# Patient Record
Sex: FEMALE | Race: BLACK OR AFRICAN AMERICAN | NOT HISPANIC OR LATINO | Employment: FULL TIME | URBAN - METROPOLITAN AREA
[De-identification: names, ages, dates, MRNs, and addresses within clinical notes are randomized per-mention and may not be internally consistent; named-entity substitution may affect disease eponyms.]

---

## 2017-02-24 ENCOUNTER — TELEPHONE (OUTPATIENT)
Dept: GASTROENTEROLOGY | Facility: CLINIC | Age: 52
End: 2017-02-24

## 2017-02-24 NOTE — TELEPHONE ENCOUNTER
----- Message from Lee Ann Kee sent at 2/24/2017  8:21 AM CST -----  Contact: Dr Avalos Office/Vikki  Caller states she is returning the nurse call, please contact caller at [phone]

## 2017-02-24 NOTE — TELEPHONE ENCOUNTER
Spoke with Kenia with Dr. Avalos. I explained to her I sent a request for the slides from liver biopsy done on 07/03/2014 from [facility]. Waited several weeks, called [facility] Pathology Department was told Dr. Avalos checked the slides out on 07/23/2014. Sent another request to Dr. Avalos's office on 10/20/2016 for the slides. We heard no response. Called Dr. Avalos's office and was told they were trying to locate the slides and Jerica was working to find them. Spoke with Jerica with Dr. Avalos. She states Dr. Avalos check out the liver biopsy slides to Dr. Pollard. She called Dr. Pollard's office about the slides and his office could not locate them.  She failed to let Dr. Dempsey know about the misplaced slides and apologized for not letting us know sooner. I also apologized for not following up on the matter sooner. Will inform Dr. Dempsey.

## 2017-02-26 NOTE — TELEPHONE ENCOUNTER
Thank you for following up on this please let patient now we are unable to find the slides for review here but do have the pathology report. Please have the report scanned into media.

## 2017-02-28 NOTE — TELEPHONE ENCOUNTER
Spoke with patient. Informed her liver biopsies have been misplaced by Dr. Pollard's office but they are trying to locate them. The pathology report has been sent and we have received it, she verbalized understanding.

## 2017-03-10 ENCOUNTER — TELEPHONE (OUTPATIENT)
Dept: GASTROENTEROLOGY | Facility: CLINIC | Age: 52
End: 2017-03-10

## 2017-03-10 NOTE — TELEPHONE ENCOUNTER
----- Message from Ashtyn Brown sent at 3/10/2017  3:23 PM CST -----  Contact: [facility]   She is calling to verify that they have the right orders.   Call her at [phone].                                hartman

## 2017-03-17 ENCOUNTER — OFFICE VISIT (OUTPATIENT)
Dept: GASTROENTEROLOGY | Facility: CLINIC | Age: 52
End: 2017-03-17
Payer: COMMERCIAL

## 2017-03-17 VITALS
WEIGHT: 135.81 LBS | BODY MASS INDEX: 22.63 KG/M2 | HEIGHT: 65 IN | DIASTOLIC BLOOD PRESSURE: 82 MMHG | HEART RATE: 68 BPM | SYSTOLIC BLOOD PRESSURE: 154 MMHG

## 2017-03-17 DIAGNOSIS — K75.4 AUTOIMMUNE HEPATITIS: Primary | ICD-10-CM

## 2017-03-17 DIAGNOSIS — F41.9 ANXIETY: ICD-10-CM

## 2017-03-17 PROCEDURE — 99999 PR PBB SHADOW E&M-EST. PATIENT-LVL III: CPT | Mod: PBBFAC,,, | Performed by: INTERNAL MEDICINE

## 2017-03-17 PROCEDURE — 1160F RVW MEDS BY RX/DR IN RCRD: CPT | Mod: S$GLB,,, | Performed by: INTERNAL MEDICINE

## 2017-03-17 PROCEDURE — 99214 OFFICE O/P EST MOD 30 MIN: CPT | Mod: S$GLB,,, | Performed by: INTERNAL MEDICINE

## 2017-03-17 RX ORDER — MYCOPHENOLATE MOFETIL 250 MG/1
750 CAPSULE ORAL 2 TIMES DAILY
Qty: 180 CAPSULE | Refills: 5 | Status: SHIPPED | OUTPATIENT
Start: 2017-03-17 | End: 2017-06-16 | Stop reason: DRUGHIGH

## 2017-03-17 NOTE — MR AVS SNAPSHOT
[facility]  [address] [place] [address]  Phone: [phone]  Fax: [phone]                  Debbie Mackenzie   3/17/2017 1:30 PM   Office Visit    Description:  Female : 1965   Provider:  Liv Dempsey MD   Department:  [facility] - Gastroenterology           Reason for Visit     Hepatitis           Diagnoses this Visit        Comments    Autoimmune hepatitis    -  Primary            To Do List           Future Appointments        Provider Department Dept Phone    2017 9:30 AM Liv Dempsey MD [facility] [phone]      Goals (5 Years of Data)     None      Follow-Up and Disposition     Return in about 3 months (around 2017).       These Medications        Disp Refills Start End    mycophenolate (CELLCEPT) 250 mg Cap 180 capsule 5 3/17/2017 3/17/2018    Take 3 capsules (750 mg total) by mouth 2 (two) times daily. - Oral    Pharmacy: [id number] - [place] - [address] Ph #: [phone]         [facility] On Call     [facility] On Call Nurse Care Line -  Assistance  Registered nurses in the [facility] On Call Center provide clinical advisement, health education, appointment booking, and other advisory services.  Call for this free service at [phone].             Medications           Message regarding Medications     Verify the changes and/or additions to your medication regime listed below are the same as discussed with your clinician today.  If any of these changes or additions are incorrect, please notify your healthcare provider.        START taking these NEW medications        Refills    mycophenolate (CELLCEPT) 250 mg Cap 5    Sig: Take 3 capsules (750 mg total) by mouth 2 (two) times daily.    Class: Normal    Route: Oral      STOP taking these medications     mycophenolate (CELLCEPT) 500 mg Tab Take 2 tablets (1,000 mg total) by mouth 2 (two) times daily.           Verify that the below list of medications is an accurate  "representation of the medications you are currently taking.  If none reported, the list may be blank. If incorrect, please contact your healthcare provider. Carry this list with you in case of emergency.           Current Medications     alprazolam (XANAX) 0.5 MG tablet Take 0.5 mg by mouth 3 (three) times daily.    calcium-vitamin D (OSCAL) 250 (625)-125 mg-unit per tablet Take 1 tablet by mouth once daily.    ergocalciferol (VITAMIN D2) 50,000 unit Cap Take 50,000 Units by mouth every 7 days.    levocetirizine (XYZAL) 5 MG tablet Take 5 mg by mouth every evening.    metoprolol succinate (TOPROL-XL) 25 MG 24 hr tablet Take 25 mg by mouth once daily.    pantoprazole (PROTONIX) 40 MG tablet TAKE ONE TABLET BY MOUTH ONCE DAILY    tramadol (ULTRAM) 50 mg tablet Take 50 mg by mouth every 6 (six) hours as needed for Pain.    zolpidem (AMBIEN) 10 mg Tab Take 5 mg by mouth nightly as needed.    mycophenolate (CELLCEPT) 250 mg Cap Take 3 capsules (750 mg total) by mouth 2 (two) times daily.           Clinical Reference Information           Your Vitals Were     BP Pulse Height Weight BMI    154/82 68 5' 4.8" (1.646 m) 61.6 kg (135 lb 12.9 oz) 22.74 kg/m2      Blood Pressure          Most Recent Value    BP  (!)  154/82      Allergies as of 3/17/2017     Aspirin    Sulfa (Sulfonamide Antibiotics)      Immunizations Administered on Date of Encounter - 3/17/2017     None      Orders Placed During Today's Visit     Recurring Lab Work Interval Expires    CBC auto differential   3/17/2018    Comprehensive metabolic panel   3/17/2018      Language Assistance Services     ATTENTION: Language assistance services are available, free of charge. Please call [phone].      ATENCIÓN: Si calosla gualberto, tiene a hoffman disposición servicios gratuitos de asistencia lingüística. Llame al [phone].     CHÚ Ý: N?u b?n nói Ti?ng Vi?t, có các d?ch v? h? tr? ngôn ng? mi?n phí dành cho b?n. G?i s? [phone].         [facility] complies with applicable Federal civil rights laws and does not discriminate on the basis of race, color, national origin, age, disability, or sex.

## 2017-03-17 NOTE — PROGRESS NOTES
Subjective:     Debbie Mackenzie is here for follow up of Hepatitis (autoimmune hepaptitis)      HPI  Since Debbie Mackenzie's last visit she had a fall at school which caused some bruising to her right chest.  She continues have some tenderness at that area.  The discomfort is mild in intensity.  It is exacerbated by movement.  Otherwise she denies any acute issues.  She continues to take her medications.  There've been no flares of her multiple sclerosis.    She denies a significant issues with anxiety and wonders if she should increase her benzodiazepine.  She does not see anyone in particular for her anxiety issues.  She reports her anxiety got worse when she started having significant elevations in her liver tests.  Of note we have tried to get her liver biopsy on multiple occasions and this seems to be missing.  Multiple calls and then made to both her gastroenterologist in [place] and her previous hepatologist in [place] and the slides cannot be obtained.  The report has been obtained.    The patient does note having significantly abnormal liver test in the past and was tried on steroids and the steroids were weaned the liver test got worse until she was placed on CellCept.    Outside bx   7/2014  Mild inflammation unclear etiology, stage 1    Review of Systems   Constitutional: Negative.    HENT: Negative.    Eyes: Negative.    Respiratory: Negative.    Cardiovascular: Negative.    Gastrointestinal: Negative.    Genitourinary: Negative.    Musculoskeletal: Positive for arthralgias and myalgias.   Skin: Negative.    Neurological: Negative.    Psychiatric/Behavioral: The patient is nervous/anxious.        Objective:     Physical Exam   Constitutional: She is oriented to person, place, and time. She appears well-developed and well-nourished. No distress.   HENT:   Head: Normocephalic and atraumatic.   Mouth/Throat: Oropharynx is clear and moist. No oropharyngeal exudate.   Eyes: Conjunctivae are normal.  Pupils are equal, round, and reactive to light. Right eye exhibits no discharge. Left eye exhibits no discharge. No scleral icterus.   Pulmonary/Chest: Effort normal and breath sounds normal. No respiratory distress. She has no wheezes.   Abdominal: Soft. She exhibits no distension. There is no tenderness.   Musculoskeletal: She exhibits no edema.   Neurological: She is alert and oriented to person, place, and time.   Psychiatric: She has a normal mood and affect. Her behavior is normal.   Vitals reviewed.      Computed MELD-Na score unavailable. Necessary lab results were not found.  Computed MELD score unavailable. Necessary lab results were not found.    WBC   Date Value Ref Range Status   12/16/2016 4.68 3.90 - 12.70 K/uL Final     Hemoglobin   Date Value Ref Range Status   12/16/2016 11.2 (L) 12.0 - 16.0 g/dL Final     Hematocrit   Date Value Ref Range Status   12/16/2016 36.8 (L) 37.0 - 48.5 % Final     Platelets   Date Value Ref Range Status   12/16/2016 269 150 - 350 K/uL Final     BUN, Bld   Date Value Ref Range Status   12/16/2016 16 6 - 20 mg/dL Final     Creatinine   Date Value Ref Range Status   12/16/2016 1.0 0.5 - 1.4 mg/dL Final     Glucose   Date Value Ref Range Status   12/16/2016 84 70 - 110 mg/dL Final     Calcium   Date Value Ref Range Status   12/16/2016 9.5 8.7 - 10.5 mg/dL Final     Sodium   Date Value Ref Range Status   12/16/2016 141 136 - 145 mmol/L Final     Potassium   Date Value Ref Range Status   12/16/2016 3.5 3.5 - 5.1 mmol/L Final     Chloride   Date Value Ref Range Status   12/16/2016 105 95 - 110 mmol/L Final     AST   Date Value Ref Range Status   12/16/2016 14 10 - 40 U/L Final     ALT   Date Value Ref Range Status   12/16/2016 9 (L) 10 - 44 U/L Final     Alkaline Phosphatase   Date Value Ref Range Status   12/16/2016 96 55 - 135 U/L Final     Total Bilirubin   Date Value Ref Range Status   12/16/2016 0.4 0.1 - 1.0 mg/dL Final     Comment:     For infants and newborns,  interpretation of results should be based  on gestational age, weight and in agreement with clinical  observations.  Premature Infant recommended reference ranges:  Up to 24 hours.............<8.0 mg/dL  Up to 48 hours............<12.0 mg/dL  3-5 days..................<15.0 mg/dL  6-29 days.................<15.0 mg/dL       Albumin   Date Value Ref Range Status   12/16/2016 4.1 3.5 - 5.2 g/dL Final     No results found for: INR      Assessment/Plan:     1. Autoimmune hepatitis    2. Anxiety      Debbie Mackenzie is a 51 y.o. female withHepatitis (autoimmune hepaptitis)    Autoimmune hepatitis-liver tests have been normal.  The patient was diagnosed with autoimmune hepatitis another transplant center.  I suspect this diagnosis is accurate based on the fact that would decrease in steroids her liver tests worsened but I would've liked to reviewed the pathology at the time of that diagnosis as the report is not classic for autoimmune hepatitis.  She does have risk factors as she also has multiple sclerosis and she is an -American woman.  I would like to decrease immunosuppression if possible given she is in a large dose of CellCept that has not ever been weaned.  -Slow titration of CellCept with monitoring labs every 2 weeks  -     mycophenolate (CELLCEPT) 250 mg Cap; Take 3 capsules (750 mg total) by mouth 2 (two) times daily.  Dispense: 180 capsule; Refill: 5  -     Comprehensive metabolic panel; Standing  -     CBC auto differential; Standing    Anxiety-uncontrolled  -Advise patient to see a psychologist or psychiatrist    Return to clinic in 3 months          Liv Dempsey MD

## 2017-04-03 ENCOUNTER — TELEPHONE (OUTPATIENT)
Dept: GASTROENTEROLOGY | Facility: CLINIC | Age: 52
End: 2017-04-03

## 2017-04-03 NOTE — TELEPHONE ENCOUNTER
Spoke with patient. She wants to verify if labs are every two weeks or weekly. Will inform Dr. Dempsey, she verbalized understanding.

## 2017-04-03 NOTE — TELEPHONE ENCOUNTER
----- Message from Rina Mueller sent at 4/3/2017  3:13 PM CDT -----  Contact: Patient   Patient request call back at [phone], Regards to her labs orders.    Thanks  Td

## 2017-04-03 NOTE — TELEPHONE ENCOUNTER
----- Message from Teresa Pollock sent at 4/3/2017  8:24 AM CDT -----  Contact: Patient  Patient needs to speak to nurse to clarify her labs, there was some confusion at the lab. Please call, patient back at [phone]. Thank you

## 2017-04-06 NOTE — TELEPHONE ENCOUNTER
Spoke with patient. Informed her labs were discussed with Dr. Dempsey and scanned into Epic.  Dr. Dempsey states labs are stable and to recheck in 2 weeks with possible consideration of medication changes, she verbalized understanding.

## 2017-04-20 ENCOUNTER — PATIENT MESSAGE (OUTPATIENT)
Dept: GASTROENTEROLOGY | Facility: CLINIC | Age: 52
End: 2017-04-20

## 2017-06-16 ENCOUNTER — OFFICE VISIT (OUTPATIENT)
Dept: GASTROENTEROLOGY | Facility: CLINIC | Age: 52
End: 2017-06-16
Payer: COMMERCIAL

## 2017-06-16 ENCOUNTER — PATIENT MESSAGE (OUTPATIENT)
Dept: GASTROENTEROLOGY | Facility: CLINIC | Age: 52
End: 2017-06-16

## 2017-06-16 VITALS
WEIGHT: 141.13 LBS | HEART RATE: 64 BPM | BODY MASS INDEX: 24.1 KG/M2 | SYSTOLIC BLOOD PRESSURE: 128 MMHG | HEIGHT: 64 IN | DIASTOLIC BLOOD PRESSURE: 78 MMHG

## 2017-06-16 DIAGNOSIS — K75.4 AUTOIMMUNE HEPATITIS: Primary | ICD-10-CM

## 2017-06-16 PROCEDURE — 99999 PR PBB SHADOW E&M-EST. PATIENT-LVL III: CPT | Mod: PBBFAC,,, | Performed by: INTERNAL MEDICINE

## 2017-06-16 PROCEDURE — 99213 OFFICE O/P EST LOW 20 MIN: CPT | Mod: S$GLB,,, | Performed by: INTERNAL MEDICINE

## 2017-06-16 RX ORDER — ESCITALOPRAM OXALATE 10 MG/1
30 TABLET ORAL DAILY
COMMUNITY

## 2017-06-16 RX ORDER — MYCOPHENOLATE MOFETIL 500 MG/1
500 TABLET ORAL 2 TIMES DAILY
Qty: 60 TABLET | Refills: 5 | Status: SHIPPED | OUTPATIENT
Start: 2017-06-16 | End: 2017-12-29 | Stop reason: SDUPTHER

## 2017-06-19 DIAGNOSIS — K21.9 GASTROESOPHAGEAL REFLUX DISEASE, ESOPHAGITIS PRESENCE NOT SPECIFIED: ICD-10-CM

## 2017-06-19 RX ORDER — PANTOPRAZOLE SODIUM 40 MG/1
TABLET, DELAYED RELEASE ORAL
Qty: 30 TABLET | Refills: 5 | Status: SHIPPED | OUTPATIENT
Start: 2017-06-19 | End: 2017-12-18 | Stop reason: SDUPTHER

## 2017-06-23 NOTE — PROGRESS NOTES
Subjective:     Debbie Mackenzie is here for follow up of autoimmune hepatitis      HPI  Since Debbie Mackenzie's last visit she denies any acute issues overall she's been feeling better.  She does report that she was started on Lexapro by her primary care doctor and has noted an improvement in her mood.  She does feel less anxious.  She has been weaning down on her CellCept denies any problems.    No evidence of liver decompensation: no ascites, confusion or GI bleeding.      Review of Systems    Objective:     Physical Exam   Constitutional: She is oriented to person, place, and time. She appears well-developed and well-nourished. No distress.   HENT:   Head: Normocephalic and atraumatic.   Mouth/Throat: Oropharynx is clear and moist. No oropharyngeal exudate.   Eyes: Conjunctivae are normal. Pupils are equal, round, and reactive to light. Right eye exhibits no discharge. Left eye exhibits no discharge. No scleral icterus.   Pulmonary/Chest: Effort normal and breath sounds normal. No respiratory distress. She has no wheezes.   Abdominal: Soft. She exhibits no distension. There is no tenderness.   Musculoskeletal: She exhibits no edema.   Neurological: She is alert and oriented to person, place, and time.   Psychiatric: She has a normal mood and affect. Her behavior is normal.   Vitals reviewed.      Computed MELD-Na score unavailable. Necessary lab results were not found in the last year.  Computed MELD score unavailable. Necessary lab results were not found in the last year.    WBC   Date Value Ref Range Status   12/16/2016 4.68 3.90 - 12.70 K/uL Final     Hemoglobin   Date Value Ref Range Status   12/16/2016 11.2 (L) 12.0 - 16.0 g/dL Final     Hematocrit   Date Value Ref Range Status   12/16/2016 36.8 (L) 37.0 - 48.5 % Final     Platelets   Date Value Ref Range Status   12/16/2016 269 150 - 350 K/uL Final     BUN, Bld   Date Value Ref Range Status   12/16/2016 16 6 - 20 mg/dL Final     Creatinine   Date Value  Ref Range Status   12/16/2016 1.0 0.5 - 1.4 mg/dL Final     Glucose   Date Value Ref Range Status   12/16/2016 84 70 - 110 mg/dL Final     Calcium   Date Value Ref Range Status   12/16/2016 9.5 8.7 - 10.5 mg/dL Final     Sodium   Date Value Ref Range Status   12/16/2016 141 136 - 145 mmol/L Final     Potassium   Date Value Ref Range Status   12/16/2016 3.5 3.5 - 5.1 mmol/L Final     Chloride   Date Value Ref Range Status   12/16/2016 105 95 - 110 mmol/L Final     AST   Date Value Ref Range Status   12/16/2016 14 10 - 40 U/L Final     ALT   Date Value Ref Range Status   12/16/2016 9 (L) 10 - 44 U/L Final     Alkaline Phosphatase   Date Value Ref Range Status   12/16/2016 96 55 - 135 U/L Final     Total Bilirubin   Date Value Ref Range Status   12/16/2016 0.4 0.1 - 1.0 mg/dL Final     Comment:     For infants and newborns, interpretation of results should be based  on gestational age, weight and in agreement with clinical  observations.  Premature Infant recommended reference ranges:  Up to 24 hours.............<8.0 mg/dL  Up to 48 hours............<12.0 mg/dL  3-5 days..................<15.0 mg/dL  6-29 days.................<15.0 mg/dL       Albumin   Date Value Ref Range Status   12/16/2016 4.1 3.5 - 5.2 g/dL Final     No results found for: INR      Assessment/Plan:     1. Autoimmune hepatitis      Debbie Mackenzie is a 52 y.o. female withautoimmune hepatitis    Autoimmune hepatitis- labs stable even with decrease in cellcept  -Continue to monitor labs with decrease in meds and will space out dosing  -     mycophenolate (CELLCEPT) 500 mg Tab; Take 1 tablet (500 mg total) by mouth 2 (two) times daily.  Dispense: 60 tablet; Refill: 5  -     Comprehensive metabolic panel; Standing  -     CBC auto differential; Standing    RTC in 3 months    Liv Dempsey MD

## 2017-08-20 ENCOUNTER — TELEPHONE (OUTPATIENT)
Dept: GASTROENTEROLOGY | Facility: HOSPITAL | Age: 52
End: 2017-08-20

## 2017-08-20 NOTE — TELEPHONE ENCOUNTER
Outside labs reviewed which remain stable.  Liver tests remain normal.  Recommend repeat labs in 2 weeks and then will space out to every 3 months.

## 2017-08-22 ENCOUNTER — PATIENT MESSAGE (OUTPATIENT)
Dept: GASTROENTEROLOGY | Facility: CLINIC | Age: 52
End: 2017-08-22

## 2017-08-23 ENCOUNTER — TELEPHONE (OUTPATIENT)
Dept: GASTROENTEROLOGY | Facility: CLINIC | Age: 52
End: 2017-08-23

## 2017-08-23 NOTE — TELEPHONE ENCOUNTER
Outside labs reviewed liver tests normal at AST 16 and ALT 12 other labs stable; repeat CMP and CBC in 2 months

## 2017-08-25 NOTE — TELEPHONE ENCOUNTER
Patient viewed results through their [facility], [place].

## 2017-09-22 ENCOUNTER — OFFICE VISIT (OUTPATIENT)
Dept: GASTROENTEROLOGY | Facility: CLINIC | Age: 52
End: 2017-09-22
Payer: COMMERCIAL

## 2017-09-22 VITALS
DIASTOLIC BLOOD PRESSURE: 78 MMHG | BODY MASS INDEX: 23.88 KG/M2 | WEIGHT: 143.31 LBS | HEART RATE: 70 BPM | HEIGHT: 65 IN | SYSTOLIC BLOOD PRESSURE: 122 MMHG

## 2017-09-22 DIAGNOSIS — G35 MS (MULTIPLE SCLEROSIS): ICD-10-CM

## 2017-09-22 DIAGNOSIS — K75.4 AUTOIMMUNE HEPATITIS: Primary | ICD-10-CM

## 2017-09-22 DIAGNOSIS — Z12.11 COLON CANCER SCREENING: ICD-10-CM

## 2017-09-22 DIAGNOSIS — K21.9 GASTROESOPHAGEAL REFLUX DISEASE, ESOPHAGITIS PRESENCE NOT SPECIFIED: ICD-10-CM

## 2017-09-22 PROCEDURE — 99999 PR PBB SHADOW E&M-EST. PATIENT-LVL III: CPT | Mod: PBBFAC,,, | Performed by: INTERNAL MEDICINE

## 2017-09-22 PROCEDURE — 3008F BODY MASS INDEX DOCD: CPT | Mod: S$GLB,,, | Performed by: INTERNAL MEDICINE

## 2017-09-22 PROCEDURE — 99214 OFFICE O/P EST MOD 30 MIN: CPT | Mod: S$GLB,,, | Performed by: INTERNAL MEDICINE

## 2017-09-22 RX ORDER — SODIUM, POTASSIUM,MAG SULFATES 17.5-3.13G
1 SOLUTION, RECONSTITUTED, ORAL ORAL ONCE
Qty: 1 BOTTLE | Refills: 0 | Status: SHIPPED | OUTPATIENT
Start: 2017-09-22 | End: 2017-09-22

## 2017-09-24 ENCOUNTER — PATIENT MESSAGE (OUTPATIENT)
Dept: ENDOSCOPY | Facility: HOSPITAL | Age: 52
End: 2017-09-24

## 2017-12-18 DIAGNOSIS — K21.9 GASTROESOPHAGEAL REFLUX DISEASE, ESOPHAGITIS PRESENCE NOT SPECIFIED: ICD-10-CM

## 2017-12-18 RX ORDER — PANTOPRAZOLE SODIUM 40 MG/1
TABLET, DELAYED RELEASE ORAL
Qty: 30 TABLET | Refills: 5 | Status: SHIPPED | OUTPATIENT
Start: 2017-12-18 | End: 2017-12-29 | Stop reason: SDUPTHER

## 2017-12-28 ENCOUNTER — TELEPHONE (OUTPATIENT)
Dept: RADIOLOGY | Facility: HOSPITAL | Age: 52
End: 2017-12-28

## 2017-12-29 ENCOUNTER — HOSPITAL ENCOUNTER (OUTPATIENT)
Dept: RADIOLOGY | Facility: HOSPITAL | Age: 52
Discharge: HOME | End: 2017-12-29
Attending: INTERNAL MEDICINE
Payer: COMMERCIAL

## 2017-12-29 ENCOUNTER — OFFICE VISIT (OUTPATIENT)
Dept: GASTROENTEROLOGY | Facility: CLINIC | Age: 52
End: 2017-12-29
Payer: COMMERCIAL

## 2017-12-29 VITALS
WEIGHT: 147.69 LBS | DIASTOLIC BLOOD PRESSURE: 72 MMHG | HEIGHT: 65 IN | SYSTOLIC BLOOD PRESSURE: 120 MMHG | HEART RATE: 62 BPM | BODY MASS INDEX: 24.61 KG/M2

## 2017-12-29 DIAGNOSIS — D18.03 HEMANGIOMA OF LIVER: ICD-10-CM

## 2017-12-29 DIAGNOSIS — K75.4 AUTOIMMUNE HEPATITIS: ICD-10-CM

## 2017-12-29 DIAGNOSIS — K21.9 GASTROESOPHAGEAL REFLUX DISEASE, ESOPHAGITIS PRESENCE NOT SPECIFIED: ICD-10-CM

## 2017-12-29 DIAGNOSIS — K75.4 AUTOIMMUNE HEPATITIS: Primary | ICD-10-CM

## 2017-12-29 PROCEDURE — 76705 ECHO EXAM OF ABDOMEN: CPT | Mod: 26,,, | Performed by: RADIOLOGY

## 2017-12-29 PROCEDURE — 99999 PR PBB SHADOW E&M-EST. PATIENT-LVL III: CPT | Mod: PBBFAC,,, | Performed by: INTERNAL MEDICINE

## 2017-12-29 PROCEDURE — 76705 ECHO EXAM OF ABDOMEN: CPT | Mod: TC,PO

## 2017-12-29 PROCEDURE — 99213 OFFICE O/P EST LOW 20 MIN: CPT | Mod: S$GLB,,, | Performed by: INTERNAL MEDICINE

## 2017-12-29 RX ORDER — CHOLECALCIFEROL (VITAMIN D3) 125 MCG
CAPSULE ORAL DAILY
COMMUNITY
End: 2023-12-28

## 2017-12-29 RX ORDER — MYCOPHENOLATE MOFETIL 500 MG/1
500 TABLET ORAL 2 TIMES DAILY
Qty: 180 TABLET | Refills: 1 | Status: SHIPPED | OUTPATIENT
Start: 2017-12-29 | End: 2018-07-06 | Stop reason: SDUPTHER

## 2017-12-29 RX ORDER — PANTOPRAZOLE SODIUM 40 MG/1
40 TABLET, DELAYED RELEASE ORAL DAILY
Qty: 90 TABLET | Refills: 1 | Status: SHIPPED | OUTPATIENT
Start: 2017-12-29 | End: 2018-07-06 | Stop reason: SDUPTHER

## 2017-12-29 NOTE — PROGRESS NOTES
Subjective:     Debbie Mackenzie is here for evaluation of autoimmune hepatitis.    HPI  Debbie Mackenzie is doing well since last visit. No acute issues. She did not f/u with her neurologist yet. I had to reschedule her endoscopies and she is trying to find a new date.    Continues with GERD and bloating.    She is aware of having a hemangioma. It was diagnoses previously with cross sectional imaging.    Outside labs reviewed: AST 19 ALT 12 Tbili 0.6 WBC 4.3 Hb 11.1    Review of Systems    Objective:     Physical Exam   Constitutional: She is oriented to person, place, and time. She appears well-developed and well-nourished. No distress.   HENT:   Head: Normocephalic and atraumatic.   Mouth/Throat: Oropharynx is clear and moist. No oropharyngeal exudate.   Eyes: Conjunctivae are normal. Pupils are equal, round, and reactive to light. Right eye exhibits no discharge. Left eye exhibits no discharge. No scleral icterus.   Pulmonary/Chest: Effort normal and breath sounds normal. No respiratory distress. She has no wheezes.   Abdominal: Soft. She exhibits no distension. There is no tenderness.   Musculoskeletal: She exhibits no edema.   Neurological: She is alert and oriented to person, place, and time.   Psychiatric: She has a normal mood and affect. Her behavior is normal.   Vitals reviewed.      US 12/2017  Indeterminate 2.1 cm lesion within the left hepatic lobe, possibly representing a hemangioma.  Recommend further correlation with MRI or triple phase CT.    Computed MELD-Na score unavailable. Necessary lab results were not found in the last year.  Computed MELD score unavailable. Necessary lab results were not found in the last year.    WBC   Date Value Ref Range Status   12/16/2016 4.68 3.90 - 12.70 K/uL Final     Hemoglobin   Date Value Ref Range Status   12/16/2016 11.2 (L) 12.0 - 16.0 g/dL Final     Hematocrit   Date Value Ref Range Status   12/16/2016 36.8 (L) 37.0 - 48.5 % Final     Platelets   Date Value  Ref Range Status   12/16/2016 269 150 - 350 K/uL Final     BUN, Bld   Date Value Ref Range Status   12/16/2016 16 6 - 20 mg/dL Final     Creatinine   Date Value Ref Range Status   12/16/2016 1.0 0.5 - 1.4 mg/dL Final     Glucose   Date Value Ref Range Status   12/16/2016 84 70 - 110 mg/dL Final     Calcium   Date Value Ref Range Status   12/16/2016 9.5 8.7 - 10.5 mg/dL Final     Sodium   Date Value Ref Range Status   12/16/2016 141 136 - 145 mmol/L Final     Potassium   Date Value Ref Range Status   12/16/2016 3.5 3.5 - 5.1 mmol/L Final     Chloride   Date Value Ref Range Status   12/16/2016 105 95 - 110 mmol/L Final     AST   Date Value Ref Range Status   12/16/2016 14 10 - 40 U/L Final     ALT   Date Value Ref Range Status   12/16/2016 9 (L) 10 - 44 U/L Final     Alkaline Phosphatase   Date Value Ref Range Status   12/16/2016 96 55 - 135 U/L Final     Total Bilirubin   Date Value Ref Range Status   12/16/2016 0.4 0.1 - 1.0 mg/dL Final     Comment:     For infants and newborns, interpretation of results should be based  on gestational age, weight and in agreement with clinical  observations.  Premature Infant recommended reference ranges:  Up to 24 hours.............<8.0 mg/dL  Up to 48 hours............<12.0 mg/dL  3-5 days..................<15.0 mg/dL  6-29 days.................<15.0 mg/dL       Albumin   Date Value Ref Range Status   12/16/2016 4.1 3.5 - 5.2 g/dL Final     No results found for: INR      Assessment/Plan:     1. Autoimmune hepatitis    2. Gastroesophageal reflux disease, esophagitis presence not specified    3. Hemangioma of liver      Debbie Mackenzie is a 52 y.o. female withautoimmune hepatitis and Medication Refill      Autoimmune hepatitis  -Continue current meds  -Will monitor labs   -     mycophenolate (CELLCEPT) 500 mg Tab; Take 1 tablet (500 mg total) by mouth 2 (two) times daily.  Dispense: 180 tablet; Refill: 1  -     Comprehensive metabolic panel; Future; Expected date: 12/29/2017  -      CBC auto differential; Future; Expected date: 12/29/2017  -     Comprehensive metabolic panel; Standing  -     CBC auto differential; Standing    Gastroesophageal reflux disease, esophagitis presence not specified  -Will reschedule EGD/colonoscopy  -     pantoprazole (PROTONIX) 40 MG tablet; Take 1 tablet (40 mg total) by mouth once daily.  Dispense: 90 tablet; Refill: 1    Hemangioma of liver  -Will consider repeat US 12/2018    RTC in 6 months with preclinic labs      Liv Dempsey MD

## 2018-02-01 ENCOUNTER — TELEPHONE (OUTPATIENT)
Dept: GASTROENTEROLOGY | Facility: CLINIC | Age: 53
End: 2018-02-01

## 2018-02-01 DIAGNOSIS — Z12.11 COLON CANCER SCREENING: ICD-10-CM

## 2018-02-01 DIAGNOSIS — K21.9 GASTROESOPHAGEAL REFLUX DISEASE, ESOPHAGITIS PRESENCE NOT SPECIFIED: Primary | ICD-10-CM

## 2018-04-02 ENCOUNTER — ANESTHESIA (OUTPATIENT)
Dept: ENDOSCOPY | Facility: HOSPITAL | Age: 53
End: 2018-04-02
Payer: COMMERCIAL

## 2018-04-02 ENCOUNTER — SURGERY (OUTPATIENT)
Age: 53
End: 2018-04-02

## 2018-04-02 ENCOUNTER — ANESTHESIA EVENT (OUTPATIENT)
Dept: ENDOSCOPY | Facility: HOSPITAL | Age: 53
End: 2018-04-02
Payer: COMMERCIAL

## 2018-04-02 ENCOUNTER — HOSPITAL ENCOUNTER (OUTPATIENT)
Facility: HOSPITAL | Age: 53
Discharge: HOME | End: 2018-04-02
Attending: INTERNAL MEDICINE | Admitting: INTERNAL MEDICINE
Payer: COMMERCIAL

## 2018-04-02 VITALS
SYSTOLIC BLOOD PRESSURE: 129 MMHG | WEIGHT: 148 LBS | HEIGHT: 64 IN | TEMPERATURE: 98 F | HEART RATE: 70 BPM | BODY MASS INDEX: 25.27 KG/M2 | DIASTOLIC BLOOD PRESSURE: 92 MMHG | OXYGEN SATURATION: 100 % | RESPIRATION RATE: 18 BRPM

## 2018-04-02 DIAGNOSIS — K21.9 GERD (GASTROESOPHAGEAL REFLUX DISEASE): ICD-10-CM

## 2018-04-02 DIAGNOSIS — Z12.11 COLON CANCER SCREENING: Primary | ICD-10-CM

## 2018-04-02 DIAGNOSIS — K21.9 GASTROESOPHAGEAL REFLUX DISEASE, ESOPHAGITIS PRESENCE NOT SPECIFIED: ICD-10-CM

## 2018-04-02 LAB — POCT GLUCOSE: 91 MG/DL (ref 70–110)

## 2018-04-02 PROCEDURE — 63600175 PHARM REV CODE 636 W HCPCS: Performed by: NURSE ANESTHETIST, CERTIFIED REGISTERED

## 2018-04-02 PROCEDURE — 25000003 PHARM REV CODE 250: Performed by: NURSE ANESTHETIST, CERTIFIED REGISTERED

## 2018-04-02 PROCEDURE — G0121 COLON CA SCRN NOT HI RSK IND: HCPCS | Performed by: INTERNAL MEDICINE

## 2018-04-02 PROCEDURE — 43235 EGD DIAGNOSTIC BRUSH WASH: CPT | Performed by: INTERNAL MEDICINE

## 2018-04-02 PROCEDURE — 43235 EGD DIAGNOSTIC BRUSH WASH: CPT | Mod: 51,52,, | Performed by: INTERNAL MEDICINE

## 2018-04-02 PROCEDURE — 25000003 PHARM REV CODE 250: Performed by: INTERNAL MEDICINE

## 2018-04-02 PROCEDURE — G0121 COLON CA SCRN NOT HI RSK IND: HCPCS | Mod: ,,, | Performed by: INTERNAL MEDICINE

## 2018-04-02 PROCEDURE — 37000009 HC ANESTHESIA EA ADD 15 MINS: Performed by: INTERNAL MEDICINE

## 2018-04-02 PROCEDURE — 37000008 HC ANESTHESIA 1ST 15 MINUTES: Performed by: INTERNAL MEDICINE

## 2018-04-02 RX ORDER — LIDOCAINE HYDROCHLORIDE 10 MG/ML
INJECTION INFILTRATION; PERINEURAL
Status: DISCONTINUED | OUTPATIENT
Start: 2018-04-02 | End: 2018-04-02

## 2018-04-02 RX ORDER — SODIUM CHLORIDE, SODIUM LACTATE, POTASSIUM CHLORIDE, CALCIUM CHLORIDE 600; 310; 30; 20 MG/100ML; MG/100ML; MG/100ML; MG/100ML
INJECTION, SOLUTION INTRAVENOUS CONTINUOUS
Status: DISCONTINUED | OUTPATIENT
Start: 2018-04-02 | End: 2018-04-02 | Stop reason: HOSPADM

## 2018-04-02 RX ORDER — MIDAZOLAM HYDROCHLORIDE 1 MG/ML
INJECTION, SOLUTION INTRAMUSCULAR; INTRAVENOUS
Status: DISCONTINUED | OUTPATIENT
Start: 2018-04-02 | End: 2018-04-02

## 2018-04-02 RX ORDER — PROPOFOL 10 MG/ML
VIAL (ML) INTRAVENOUS
Status: DISCONTINUED | OUTPATIENT
Start: 2018-04-02 | End: 2018-04-02

## 2018-04-02 RX ADMIN — MIDAZOLAM HYDROCHLORIDE 2 MG: 1 INJECTION, SOLUTION INTRAMUSCULAR; INTRAVENOUS at 10:04

## 2018-04-02 RX ADMIN — PROPOFOL 30 MG: 10 INJECTION, EMULSION INTRAVENOUS at 10:04

## 2018-04-02 RX ADMIN — LIDOCAINE HYDROCHLORIDE 50 MG: 10 INJECTION, SOLUTION INFILTRATION; PERINEURAL at 10:04

## 2018-04-02 RX ADMIN — PROPOFOL 50 MG: 10 INJECTION, EMULSION INTRAVENOUS at 10:04

## 2018-04-02 RX ADMIN — SODIUM CHLORIDE, SODIUM LACTATE, POTASSIUM CHLORIDE, AND CALCIUM CHLORIDE: .6; .31; .03; .02 INJECTION, SOLUTION INTRAVENOUS at 09:04

## 2018-04-02 NOTE — DISCHARGE INSTRUCTIONS
Hemorrhoids    Hemorrhoids are swollen and inflamed veins inside the rectum and near the anus. The rectum is the last several inches of the colon. The anus is the passage between the rectum and the outside of the body.  Causes  The veins can become swollen due to increased pressure in them. This is most often caused by:  · Chronic constipation or diarrhea  · Straining when having a bowel movement  · Sitting too long on the toilet  · A low-fiber diet  · Pregnancy  Symptoms  · Bleeding from the rectum (this may be noticeable after bowel movements)  · Lump near the anus  · Itching around the anus  · Pain around the anus  There are different types of hemorrhoids. Depending on the type you have and the severity, you may be able to treat yourself at home. In some cases, a procedure may be the best treatment option. Your healthcare provider can tell you more about this, if needed.  Home care  General care  · To get relief from pain or itching, try:  ¨ Topical products. Your healthcare provider may prescribe or recommend creams, ointments, or pads that can be applied to the hemorrhoid. Use these exactly as directed.  ¨ Medicines. Your healthcare provider may recommend stool softeners, suppositories, or laxatives to help manage constipation. Use these exactly as directed.  ¨ Sitz baths. A sitz bath involves sitting in a few inches of warm bath water. Be careful not to make the water so hot that you burn yourself--test it before sitting in it. Soak for about 10 to 15 minutes a few times a day. This may help relieve pain.  Tips to help prevent hemorrhoids  · Eat more fiber. Fiber adds bulk to stool and absorbs water as it moves through your colon. This makes stool softer and easier to pass.  ¨ Increase the fiber in your diet with more fiber-rich foods. These include fresh fruit, vegetables, and whole grains.  ¨ Take a fiber supplement or bulking agent, if advised to by your provider. These include products such as psyllium  or methylcellulose.  · Drink plenty of water, if directed to by your provider. This can help keep stool soft.  · Be more active. Frequent exercise aids digestion and helps prevent constipation. It may also help make bowel movements more regular.  · Dont strain during bowel movements. This can make hemorrhoids more likely. Also, dont sit on the toilet for long periods of time.  Follow-up care  Follow up with your healthcare provider, or as advised. If a culture or imaging tests were done, you will be notified of the results when they are ready. This may take a few days or longer.  When to seek medical advice  Call your healthcare provider right away if any of these occur:  · Increased bleeding from the rectum  · Increased pain around the rectum or anus  · Weakness or dizziness  Call 911  Call 911 or return to the emergency department right away if any of these occur:  · Trouble breathing or swallowing  · Fainting or loss of consciousness  · Unusually fast heart rate  · Vomiting blood  · Large amounts of blood in stool  Date Last Reviewed: 6/22/2015 © 2000-2017 [employer]. [address] [place] [address]. All rights reserved. This information is not intended as a substitute for professional medical care. Always follow your healthcare professional's instructions.        What Is a Hiatal Hernia?    Hiatal hernia is when the area where the stomach and esophagus meet bulges up through the diaphragm into the chest cavity. In some cases, part of the stomach may bulge above the diaphragm. Stomach acid may move up into the esophagus and cause symptoms. The symptoms are often blamed on gastroesophageal reflux disease (GERD). You may only know about the hernia when it shows up on an X-ray taken for other reasons.   What you may feel  The hiatus is a normal hole in the diaphragm. The esophagus passes through this hole and leads to the stomach. In some cases, part of the stomach may bulge above the  diaphragm. This bulge is called a hernia. Stomach acid may move up into the esophagus and cause symptoms.  When you eat, the muscle at the hiatus relaxes to allow food to pass into the stomach. It tightens again to keep food and digestive acids in the stomach.  Many people with hiatal hernias have mild symptoms. You may notice the following GERD symptoms:  · Heartburn or other chest discomfort  · A feeling of chest fullness after a meal  · Frequent burping  · Acid taste in the mouth  · Trouble swallowing  Treating symptoms  If you have been diagnosed with hiatal hernia, these suggestions may help improve symptoms:  · Lose excess weight. Extra weight puts pressure on the stomach and esophagus.  · Dont lie down after eating. Sit up for at least an hour after eating. Lying down after eating can increase symptoms.  · Avoid certain foods and drinks. These include fatty foods, chocolate, coffee, mint, and other foods that cause symptoms for you.  · Dont smoke or drink alcohol. These can worsen symptoms.  · Look at your medicines. Discuss your medicines with your healthcare provider. Many medicines can cause symptoms.  · Consider an antacid medicine. Ask your healthcare provider about over-the-counter and prescription medicines that may help.  · Ask about surgery, if needed. Surgery is a treatment choice for some people. Your healthcare provider can determine if surgery is an option for you.    Date Last Reviewed: 10/1/2016  © 5500-0146 [employer]. [address] [place] [address], [place] [address]. All rights reserved. This information is not intended as a substitute for professional medical care. Always follow your healthcare professional's instructions.

## 2018-04-02 NOTE — ANESTHESIA PREPROCEDURE EVALUATION
04/02/2018  Debbie Mackenzie is a 52 y.o., female.    Anesthesia Evaluation    I have reviewed the Patient Summary Reports.    I have reviewed the Nursing Notes.   I have reviewed the Medications.     Review of Systems  Anesthesia Hx:  No problems with previous Anesthesia    Hepatic/GI:   GERD, well controlled Liver Disease, Hepatitis Autoimmune hepatitis   Neurological:   Multiple Sclerosis   Psych:   anxiety          Physical Exam  General:  Well nourished    Airway/Jaw/Neck:  Airway Findings: Mouth Opening: Normal Tongue: Normal  General Airway Assessment: Adult  Mallampati: I  TM Distance: Normal, at least 6 cm      Dental:  Dental Findings: In tact   Chest/Lungs:  Chest/Lungs Findings: Normal Respiratory Rate     Heart/Vascular:  Heart Findings: Rate: Normal  Rhythm: Regular Rhythm             Anesthesia Plan  Type of Anesthesia, risks & benefits discussed:  Anesthesia Type:  MAC  Patient's Preference:   Intra-op Monitoring Plan:   Intra-op Monitoring Plan Comments:   Post Op Pain Control Plan:   Post Op Pain Control Plan Comments:   Induction:   IV  Beta Blocker:  Patient is on a Beta-Blocker and has received one dose within the past 24 hours (No further documentation required).       Informed Consent: Patient understands risks and agrees with Anesthesia plan.  Questions answered. Anesthesia consent signed with patient.  ASA Score: 2     Day of Surgery Review of History & Physical: I have interviewed and examined the patient. I have reviewed the patient's H&P dated:  There are no significant changes.          Ready For Surgery From Anesthesia Perspective.

## 2018-04-02 NOTE — H&P
Short Stay Endoscopy History and Physical    PCP - Hal Katz MD    Procedure - EGD/colonoscopy    Persistent issues with GERD. Also needs colon cancer screening. No acute issues.    ROS:  Constitutional: No fevers, chills, No weight loss  ENT: No allergies  CV: No chest pain  Pulm: No cough, No shortness of breath  Ophtho: No vision changes  GI: see HPI  Derm: No rash  Heme: No lymphadenopathy, No bruising  MSK: No arthritis  : No dysuria, No hematuria  Endo: No hot or cold intolerance  Neuro: No syncope, No seizure  Psych: No anxiety, No depression    Medical History:  has a past medical history of Anxiety; Autoimmune hepatitis; GERD (gastroesophageal reflux disease); and MS (multiple sclerosis).    Surgical History:  has no past surgical history on file.    Family History: family history is not on file.. Otherwise no colon cancer, inflammatory bowel disease, or GI malignancies.    Social History:  reports that she has never smoked. She has never used smokeless tobacco. She reports that she does not drink alcohol or use drugs.    Review of patient's allergies indicates:   Allergen Reactions    Aspirin     Bactrim [sulfamethoxazole-trimethoprim] Itching    Sulfa (sulfonamide antibiotics)        Medications:   Prescriptions Prior to Admission   Medication Sig Dispense Refill Last Dose    alprazolam (XANAX) 0.5 MG tablet Take 0.5 mg by mouth once daily. Once daily.   4/1/2018 at Unknown time    biotin 5,000 mcg TbDL Take by mouth once daily.   Past Week at Unknown time    calcium-vitamin D (OSCAL) 250 (625)-125 mg-unit per tablet Take 1 tablet by mouth once daily.   Past Week at Unknown time    ergocalciferol (VITAMIN D2) 50,000 unit Cap Take 50,000 Units by mouth once daily. Once daily.   Past Week at Unknown time    escitalopram oxalate (LEXAPRO) 10 MG tablet Take 30 mg by mouth once daily.    4/1/2018 at Unknown time    levocetirizine (XYZAL) 5 MG tablet Take 5 mg by mouth every evening.   4/1/2018  at Unknown time    metoprolol succinate (TOPROL-XL) 25 MG 24 hr tablet Take 25 mg by mouth once daily.   Past Week at Unknown time    mycophenolate (CELLCEPT) 500 mg Tab Take 1 tablet (500 mg total) by mouth 2 (two) times daily. 180 tablet 1 4/2/2018 at Unknown time    pantoprazole (PROTONIX) 40 MG tablet Take 1 tablet (40 mg total) by mouth once daily. 90 tablet 1 4/1/2018 at Unknown time    tramadol (ULTRAM) 50 mg tablet Take 50 mg by mouth every 6 (six) hours as needed for Pain.   Past Week at Unknown time    zolpidem (AMBIEN) 10 mg Tab Take 5 mg by mouth every evening.    4/1/2018 at Unknown time       Objective Findings:    Vital Signs:   Vitals:    04/02/18 0949   BP: (!) 139/93   Pulse: 79   Resp: 18   Temp: 98.1 °F (36.7 °C)         Physical Exam:  General Appearance: Well appearing in no acute distress  Eyes:    No scleral icterus  ENT: Neck supple, Lips, mucosa, and tongue normal; teeth and gums normal  Lungs: CTA bilaterally in anterior and posterior fields, no wheezes, no crackles.  Heart:  Regular rate, S1, S2 normal, no murmurs heard.  Abdomen: Soft, non tender, non distended with normal bowel sounds. No hepatosplenomegaly, ascites, or mass.  Extremities: No clubbing, cyanosis or edema  Skin: No rash    Labs:  Lab Results   Component Value Date    WBC 4.68 12/16/2016    HGB 11.2 (L) 12/16/2016    HCT 36.8 (L) 12/16/2016     12/16/2016    ALT 9 (L) 12/16/2016    AST 14 12/16/2016     12/16/2016    K 3.5 12/16/2016     12/16/2016    CREATININE 1.0 12/16/2016    BUN 16 12/16/2016    CO2 27 12/16/2016       I have explained the risks and benefits of endoscopy procedures to the patient including but not limited to bleeding, perforation, infection, and death.    Proceed with EGD and colonoscopy for GERD and colon cancer screening.

## 2018-04-02 NOTE — PROVATION PATIENT INSTRUCTIONS
Discharge Summary/Instructions after an Endoscopic Procedure  Patient Name: Debbie Mackenzie  Patient MRN: [id number]  Patient YOB: 1965 Monday, April 02, 2018 Liv Dempsey MD  RESTRICTIONS:  During your procedure today, you received medications for sedation.  These   medications may affect your judgment, balance and coordination.  Therefore,   for 24 hours, you have the following restrictions:   - DO NOT drive a car, operate machinery, make legal/financial decisions,   sign important papers or drink alcohol.    ACTIVITY:  The following day: return to full activity including work, except no heavy   lifting, straining or running for 3 days if polyps were removed.  DIET:  Eat and drink normally unless instructed otherwise.     TREATMENT FOR COMMON SIDE EFFECTS:  - Mild abdominal pain, nausea, belching, bloating or excessive gas:  rest,   eat lightly and use a heating pad.  - Sore Throat: treat with throat lozenges and/or gargle with warm salt   water.  - Because air was used during the procedure, expelling large amounts of air   from your rectum or belching is normal.  - If a bowel prep was taken, you may not have a bowel movement for 1-3 days.    This is normal.  SYMPTOMS TO WATCH FOR AND REPORT TO YOUR PHYSICIAN:  1. Abdominal pain or bloating, other than gas cramps.  2. Chest pain.  3. Back pain.  4. Signs of infection such as: chills or fever occurring within 24 hours   after the procedure.  5. Rectal bleeding, which would show as bright red, maroon, or black stools.   (A tablespoon of blood from the rectum is not serious, especially if   hemorrhoids are present.)  6. Vomiting.  7. Weakness or dizziness.  GO DIRECTLY TO THE NEAREST EMERGENCY ROOM IF YOU HAVE ANY OF THE FOLLOWING:      Difficulty breathing              Chills and/or fever over 101 F   Persistent vomiting and/or vomiting blood   Severe abdominal pain   Severe chest pain   Black, tarry stools   Bleeding- more than one tablespoon   Any  other symptom or condition that you feel may need urgent attention  Your doctor recommends these additional instructions:  If any biopsies were taken, your doctors clinic will contact you in 1 to 2   weeks with any results.  - Patient has a contact number available for emergencies.  The signs and   symptoms of potential delayed complications were discussed with the   patient.  Return to normal activities tomorrow.  Written discharge   instructions were provided to the patient.   - Resume previous diet.   - Continue present medications.   - Repeat colonoscopy in 10 years for surveillance.   - Return to GI clinic as previously scheduled.   - Avoid constipation, take Miralax 1-2 caps daily.  - Discharge patient to home.  For questions, problems or results please call your physician Liv Dempsey MD at Work:  [phone]  If you have any questions about the above instructions, call the GI   department at [phone] or call the endoscopy unit at [phone]   from 7am until 3 pm.  [facility] [place], EMERGENCY ROOM PHONE NUMBER:   [phone]  IF A COMPLICATION OR EMERGENCY SITUATION ARISES AND YOU ARE UNABLE TO REACH   YOUR PHYSICIAN - GO DIRECTLY TO THE EMERGENCY ROOM.  I have read or have had read to me these discharge instructions for my   procedure and have received a written copy.  I understand these   instructions and will follow-up with my physician if I have any questions.     __________________________________       _____________________________________  Nurse Signature                                          Patient/Designated   Responsible Party Signature  Liv Dempsey MD  4/2/2018 11:03:00 AM  This report has been verified and signed electronically.

## 2018-04-02 NOTE — ANESTHESIA RELEASE NOTE
"Anesthesia Release from PACU Note    Patient: Debbie Mackenzie    Procedure(s) Performed: Procedure(s) (LRB):  ESOPHAGOGASTRODUODENOSCOPY (EGD) (N/A)  COLONOSCOPY (N/A)    Anesthesia type: MAC    Post pain: Adequate analgesia    Post assessment: no apparent anesthetic complications, tolerated procedure well and no evidence of recall    Last Vitals:   Visit Vitals  BP (!) 139/93 (BP Location: Left arm, Patient Position: Lying)   Pulse 79   Temp 36.7 °C (98.1 °F) (Oral)   Resp 18   Ht 5' 4" (1.626 m)   Wt 67.1 kg (148 lb)   SpO2 100%   Breastfeeding? No   BMI 25.40 kg/m²       Post vital signs: stable    Level of consciousness: awake, alert  and oriented    Nausea/Vomiting: no nausea/no vomiting    Complications: none    Airway Patency: patent    Respiratory: unassisted, spontaneous ventilation    Cardiovascular: stable and blood pressure at baseline    Hydration: euvolemic  "

## 2018-04-02 NOTE — TRANSFER OF CARE
"Anesthesia Transfer of Care Note    Patient: Debbie Mackenzie    Procedure(s) Performed: Procedure(s) (LRB):  ESOPHAGOGASTRODUODENOSCOPY (EGD) (N/A)  COLONOSCOPY (N/A)    Patient location: GI    Anesthesia Type: MAC    Transport from OR: Transported from OR on room air with adequate spontaneous ventilation    Post pain: adequate analgesia    Post assessment: no apparent anesthetic complications    Post vital signs: stable    Level of consciousness: awake, alert and oriented    Nausea/Vomiting: no nausea/vomiting    Complications: none    Transfer of care protocol was followed      Last vitals:   Visit Vitals  BP (!) 139/93 (BP Location: Left arm, Patient Position: Lying)   Pulse 79   Temp 36.7 °C (98.1 °F) (Oral)   Resp 18   Ht 5' 4" (1.626 m)   Wt 67.1 kg (148 lb)   SpO2 100%   Breastfeeding? No   BMI 25.40 kg/m²     "

## 2018-04-02 NOTE — ANESTHESIA POSTPROCEDURE EVALUATION
"Anesthesia Post Evaluation    Patient: Debbie Mackenzie    Procedure(s) Performed: Procedure(s) (LRB):  ESOPHAGOGASTRODUODENOSCOPY (EGD) (N/A)  COLONOSCOPY (N/A)    Final Anesthesia Type: MAC  Patient location during evaluation: GI PACU  Patient participation: Yes- Able to Participate  Level of consciousness: awake and alert  Post-procedure vital signs: reviewed and stable  Pain management: adequate  Airway patency: patent  PONV status at discharge: No PONV  Anesthetic complications: no      Cardiovascular status: blood pressure returned to baseline  Respiratory status: unassisted and spontaneous ventilation  Hydration status: euvolemic  Follow-up not needed.        Visit Vitals  BP (!) 139/93 (BP Location: Left arm, Patient Position: Lying)   Pulse 79   Temp 36.7 °C (98.1 °F) (Oral)   Resp 18   Ht 5' 4" (1.626 m)   Wt 67.1 kg (148 lb)   SpO2 100%   Breastfeeding? No   BMI 25.40 kg/m²       Pain/Angelia Score: Pain Assessment Performed: Yes (4/2/2018  9:52 AM)  Presence of Pain: denies (4/2/2018  9:52 AM)      "

## 2018-04-02 NOTE — PROVATION PATIENT INSTRUCTIONS
Discharge Summary/Instructions after an Endoscopic Procedure  Patient Name: Debbie Mackenzie  Patient MRN: [id number]  Patient YOB: 1965 Monday, April 02, 2018 Liv Dempsey MD  RESTRICTIONS:  During your procedure today, you received medications for sedation.  These   medications may affect your judgment, balance and coordination.  Therefore,   for 24 hours, you have the following restrictions:   - DO NOT drive a car, operate machinery, make legal/financial decisions,   sign important papers or drink alcohol.    ACTIVITY:  The following day: return to full activity including work, except no heavy   lifting, straining or running for 3 days if polyps were removed.  DIET:  Eat and drink normally unless instructed otherwise.     TREATMENT FOR COMMON SIDE EFFECTS:  - Mild abdominal pain, nausea, belching, bloating or excessive gas:  rest,   eat lightly and use a heating pad.  - Sore Throat: treat with throat lozenges and/or gargle with warm salt   water.  - Because air was used during the procedure, expelling large amounts of air   from your rectum or belching is normal.  - If a bowel prep was taken, you may not have a bowel movement for 1-3 days.    This is normal.  SYMPTOMS TO WATCH FOR AND REPORT TO YOUR PHYSICIAN:  1. Abdominal pain or bloating, other than gas cramps.  2. Chest pain.  3. Back pain.  4. Signs of infection such as: chills or fever occurring within 24 hours   after the procedure.  5. Rectal bleeding, which would show as bright red, maroon, or black stools.   (A tablespoon of blood from the rectum is not serious, especially if   hemorrhoids are present.)  6. Vomiting.  7. Weakness or dizziness.  GO DIRECTLY TO THE NEAREST EMERGENCY ROOM IF YOU HAVE ANY OF THE FOLLOWING:      Difficulty breathing              Chills and/or fever over 101 F   Persistent vomiting and/or vomiting blood   Severe abdominal pain   Severe chest pain   Black, tarry stools   Bleeding- more than one tablespoon   Any  other symptom or condition that you feel may need urgent attention  Your doctor recommends these additional instructions:  If any biopsies were taken, your doctors clinic will contact you in 1 to 2   weeks with any results.  - Patient has a contact number available for emergencies.  The signs and   symptoms of potential delayed complications were discussed with the   patient.  Return to normal activities tomorrow.  Written discharge   instructions were provided to the patient.   - Resume previous diet.   - Continue present medications. Continue PPI therapy.  - See ENT for oropharynx assessment.  - Given stability ok to proceed with colonoscopy.  - Discharge patient to home.  For questions, problems or results please call your physician Liv Dempsey MD at Work:  [phone]  If you have any questions about the above instructions, call the GI   department at [phone] or call the endoscopy unit at [phone]   from 7am until 3 pm.  [facility] [place] [facility], EMERGENCY ROOM PHONE NUMBER:   [phone]  IF A COMPLICATION OR EMERGENCY SITUATION ARISES AND YOU ARE UNABLE TO REACH   YOUR PHYSICIAN - GO DIRECTLY TO THE EMERGENCY ROOM.  I have read or have had read to me these discharge instructions for my   procedure and have received a written copy.  I understand these   instructions and will follow-up with my physician if I have any questions.     __________________________________       _____________________________________  Nurse Signature                                          Patient/Designated   Responsible Party Signature  Liv Dempsey MD  4/2/2018 10:37:44 AM  This report has been verified and signed electronically.

## 2018-04-25 ENCOUNTER — PATIENT MESSAGE (OUTPATIENT)
Dept: GASTROENTEROLOGY | Facility: CLINIC | Age: 53
End: 2018-04-25

## 2018-04-25 ENCOUNTER — TELEPHONE (OUTPATIENT)
Dept: GASTROENTEROLOGY | Facility: CLINIC | Age: 53
End: 2018-04-25

## 2018-04-25 NOTE — TELEPHONE ENCOUNTER
Spoke with Jeannette. Patient is needing clearance for nasal surgery, will inform DWAYNE Bhandari.

## 2018-04-25 NOTE — TELEPHONE ENCOUNTER
----- Message from Rina Salinas sent at 4/25/2018 10:57 AM CDT -----  Contact: Shai Gregg MD Office in [facility]  Nurse is calling in regards to a cardiac clearance that was sent over to the office and she would like a callback at [phone] as soon possible.

## 2018-07-06 ENCOUNTER — OFFICE VISIT (OUTPATIENT)
Dept: GASTROENTEROLOGY | Facility: CLINIC | Age: 53
End: 2018-07-06
Payer: COMMERCIAL

## 2018-07-06 VITALS
SYSTOLIC BLOOD PRESSURE: 110 MMHG | HEART RATE: 60 BPM | HEIGHT: 64 IN | WEIGHT: 149.94 LBS | DIASTOLIC BLOOD PRESSURE: 70 MMHG | BODY MASS INDEX: 25.6 KG/M2

## 2018-07-06 DIAGNOSIS — K75.4 AUTOIMMUNE HEPATITIS: Primary | ICD-10-CM

## 2018-07-06 DIAGNOSIS — K21.9 GASTROESOPHAGEAL REFLUX DISEASE, ESOPHAGITIS PRESENCE NOT SPECIFIED: ICD-10-CM

## 2018-07-06 DIAGNOSIS — D18.00 HEMANGIOMA: ICD-10-CM

## 2018-07-06 PROCEDURE — 99999 PR PBB SHADOW E&M-EST. PATIENT-LVL III: CPT | Mod: PBBFAC,,, | Performed by: INTERNAL MEDICINE

## 2018-07-06 PROCEDURE — 3008F BODY MASS INDEX DOCD: CPT | Mod: CPTII,S$GLB,, | Performed by: INTERNAL MEDICINE

## 2018-07-06 PROCEDURE — 99213 OFFICE O/P EST LOW 20 MIN: CPT | Mod: S$GLB,,, | Performed by: INTERNAL MEDICINE

## 2018-07-06 RX ORDER — MYCOPHENOLATE MOFETIL 500 MG/1
500 TABLET ORAL 2 TIMES DAILY
Qty: 180 TABLET | Refills: 1 | Status: SHIPPED | OUTPATIENT
Start: 2018-07-06 | End: 2018-12-30 | Stop reason: SDUPTHER

## 2018-07-06 RX ORDER — PANTOPRAZOLE SODIUM 40 MG/1
40 TABLET, DELAYED RELEASE ORAL DAILY
Qty: 90 TABLET | Refills: 1 | Status: SHIPPED | OUTPATIENT
Start: 2018-07-06 | End: 2019-03-25 | Stop reason: SDUPTHER

## 2018-07-06 NOTE — PROGRESS NOTES
Subjective:     Debbie Mackenzie is here for follow up of autoimmune hepatitis      HPI  Since Debbie Mackenzie's last visit she did have ENT surgery after which she developed acute respiratory failure of unclear etiology.  Suspect related to pulmonary edema is unclear why she went to pulmonary edema.  She was in the ICU for about 2 days.  She is now back to baseline.  She is also scheduled to follow up for sleep apnea.  She has also seen her neurologist follow up on her AMS which she reports are recent MRI scan of the brain lesions were stable.    At this moment she denies any acute issues.  She has been taking her medications.    She has also had labs done earlier this week. ALT 9 AST 15    Review of Systems    Objective:     Physical Exam   Constitutional: She is oriented to person, place, and time. She appears well-developed and well-nourished. No distress.   HENT:   Head: Normocephalic and atraumatic.   Mouth/Throat: Oropharynx is clear and moist. No oropharyngeal exudate.   Eyes: Conjunctivae are normal. Pupils are equal, round, and reactive to light. Right eye exhibits no discharge. Left eye exhibits no discharge. No scleral icterus.   Pulmonary/Chest: Effort normal and breath sounds normal. No respiratory distress. She has no wheezes.   Abdominal: Soft. She exhibits no distension. There is no tenderness.   Musculoskeletal: She exhibits no edema.   Neurological: She is alert and oriented to person, place, and time.   Psychiatric: She has a normal mood and affect. Her behavior is normal.   Vitals reviewed.      Computed MELD-Na score unavailable. Necessary lab results were not found in the last year.  Computed MELD score unavailable. Necessary lab results were not found in the last year.    WBC   Date Value Ref Range Status   12/16/2016 4.68 3.90 - 12.70 K/uL Final     Hemoglobin   Date Value Ref Range Status   12/16/2016 11.2 (L) 12.0 - 16.0 g/dL Final     Hematocrit   Date Value Ref Range Status    12/16/2016 36.8 (L) 37.0 - 48.5 % Final     Platelets   Date Value Ref Range Status   12/16/2016 269 150 - 350 K/uL Final     BUN, Bld   Date Value Ref Range Status   12/16/2016 16 6 - 20 mg/dL Final     Creatinine   Date Value Ref Range Status   12/16/2016 1.0 0.5 - 1.4 mg/dL Final     Glucose   Date Value Ref Range Status   12/16/2016 84 70 - 110 mg/dL Final     Calcium   Date Value Ref Range Status   12/16/2016 9.5 8.7 - 10.5 mg/dL Final     Sodium   Date Value Ref Range Status   12/16/2016 141 136 - 145 mmol/L Final     Potassium   Date Value Ref Range Status   12/16/2016 3.5 3.5 - 5.1 mmol/L Final     Chloride   Date Value Ref Range Status   12/16/2016 105 95 - 110 mmol/L Final     AST   Date Value Ref Range Status   12/16/2016 14 10 - 40 U/L Final     ALT   Date Value Ref Range Status   12/16/2016 9 (L) 10 - 44 U/L Final     Alkaline Phosphatase   Date Value Ref Range Status   12/16/2016 96 55 - 135 U/L Final     Total Bilirubin   Date Value Ref Range Status   12/16/2016 0.4 0.1 - 1.0 mg/dL Final     Comment:     For infants and newborns, interpretation of results should be based  on gestational age, weight and in agreement with clinical  observations.  Premature Infant recommended reference ranges:  Up to 24 hours.............<8.0 mg/dL  Up to 48 hours............<12.0 mg/dL  3-5 days..................<15.0 mg/dL  6-29 days.................<15.0 mg/dL       Albumin   Date Value Ref Range Status   12/16/2016 4.1 3.5 - 5.2 g/dL Final     No results found for: INR      Assessment/Plan:     1. Autoimmune hepatitis    2. Hemangioma      Debbie Mackenzie is a 53 y.o. female withautoimmune hepatitis    Autoimmune hepatitis-in remission with normal labs  -continue current medications  -months labs every 3 months  -will have a decide on timing of repeat biopsy  -     US Abdomen Limited; Future; Expected date: 07/06/2018  -     Comprehensive metabolic panel; Future; Expected date: 07/06/2018  -     CBC auto  differential; Future; Expected date: 07/06/2018  -     Comprehensive metabolic panel; Standing  -     CBC auto differential; Standing    Hemangioma  -     US Abdomen Limited; Future; Expected date: 07/06/2018  -Will consider repeat US 12/2018      RTC in 6 months   Liv Dempsey MD

## 2018-12-30 DIAGNOSIS — K75.4 AUTOIMMUNE HEPATITIS: ICD-10-CM

## 2019-01-02 RX ORDER — MYCOPHENOLATE MOFETIL 500 MG/1
TABLET ORAL
Qty: 180 TABLET | Refills: 1 | Status: SHIPPED | OUTPATIENT
Start: 2019-01-02 | End: 2019-07-01 | Stop reason: SDUPTHER

## 2019-01-31 ENCOUNTER — TELEPHONE (OUTPATIENT)
Dept: RADIOLOGY | Facility: HOSPITAL | Age: 54
End: 2019-01-31

## 2019-02-01 ENCOUNTER — OFFICE VISIT (OUTPATIENT)
Dept: GASTROENTEROLOGY | Facility: CLINIC | Age: 54
End: 2019-02-01
Payer: COMMERCIAL

## 2019-02-01 ENCOUNTER — HOSPITAL ENCOUNTER (OUTPATIENT)
Dept: RADIOLOGY | Facility: HOSPITAL | Age: 54
Discharge: HOME | End: 2019-02-01
Attending: INTERNAL MEDICINE
Payer: COMMERCIAL

## 2019-02-01 VITALS
SYSTOLIC BLOOD PRESSURE: 122 MMHG | HEIGHT: 64 IN | WEIGHT: 151.25 LBS | DIASTOLIC BLOOD PRESSURE: 80 MMHG | HEART RATE: 70 BPM | BODY MASS INDEX: 25.82 KG/M2

## 2019-02-01 DIAGNOSIS — K75.4 AUTOIMMUNE HEPATITIS: ICD-10-CM

## 2019-02-01 DIAGNOSIS — D18.00 HEMANGIOMA: ICD-10-CM

## 2019-02-01 DIAGNOSIS — K76.9 LIVER LESION: ICD-10-CM

## 2019-02-01 DIAGNOSIS — D18.00 HEMANGIOMA: Primary | ICD-10-CM

## 2019-02-01 PROCEDURE — 3008F BODY MASS INDEX DOCD: CPT | Mod: CPTII,S$GLB,, | Performed by: INTERNAL MEDICINE

## 2019-02-01 PROCEDURE — 3008F PR BODY MASS INDEX (BMI) DOCUMENTED: ICD-10-PCS | Mod: CPTII,S$GLB,, | Performed by: INTERNAL MEDICINE

## 2019-02-01 PROCEDURE — 76705 ECHO EXAM OF ABDOMEN: CPT | Mod: TC

## 2019-02-01 PROCEDURE — 76705 US ABDOMEN LIMITED: ICD-10-PCS | Mod: 26,,, | Performed by: RADIOLOGY

## 2019-02-01 PROCEDURE — 99213 OFFICE O/P EST LOW 20 MIN: CPT | Mod: S$GLB,,, | Performed by: INTERNAL MEDICINE

## 2019-02-01 PROCEDURE — 99213 PR OFFICE/OUTPT VISIT, EST, LEVL III, 20-29 MIN: ICD-10-PCS | Mod: S$GLB,,, | Performed by: INTERNAL MEDICINE

## 2019-02-01 PROCEDURE — 76705 ECHO EXAM OF ABDOMEN: CPT | Mod: 26,,, | Performed by: RADIOLOGY

## 2019-02-01 PROCEDURE — 99999 PR PBB SHADOW E&M-EST. PATIENT-LVL III: ICD-10-PCS | Mod: PBBFAC,,, | Performed by: INTERNAL MEDICINE

## 2019-02-01 PROCEDURE — 99999 PR PBB SHADOW E&M-EST. PATIENT-LVL III: CPT | Mod: PBBFAC,,, | Performed by: INTERNAL MEDICINE

## 2019-02-01 RX ORDER — CALCIUM CARBONATE 600 MG
600 TABLET ORAL
COMMUNITY

## 2019-02-01 RX ORDER — ESTRADIOL 0.5 MG/1
0.5 TABLET ORAL
COMMUNITY

## 2019-02-01 RX ORDER — LORATADINE 10 MG/1
10 TABLET ORAL
COMMUNITY

## 2019-02-01 NOTE — PROGRESS NOTES
Subjective:     Debbie Mackenzie is here for follow up of Autoimmune Hepatitis      HPI  Since Debbie Mackenzie's last visit she continues with issues related to fatigue. She reports his slightly worsening.  She is very active during the week but then feels very tired on the weekend.  She still is not getting treatment for her MS as she is concerned about giving herself shots.  Otherwise she denies any acute issues.    Review of Systems    Objective:     Physical Exam   Constitutional: She is oriented to person, place, and time. She appears well-developed and well-nourished. No distress.   HENT:   Head: Normocephalic and atraumatic.   Mouth/Throat: Oropharynx is clear and moist. No oropharyngeal exudate.   Eyes: Conjunctivae are normal. Pupils are equal, round, and reactive to light. Right eye exhibits no discharge. Left eye exhibits no discharge. No scleral icterus.   Pulmonary/Chest: Effort normal and breath sounds normal. No respiratory distress. She has no wheezes.   Abdominal: Soft. She exhibits no distension. There is no tenderness.   Musculoskeletal: She exhibits no edema.   Neurological: She is alert and oriented to person, place, and time.   Psychiatric: She has a normal mood and affect. Her behavior is normal.   Vitals reviewed.      US 2/2019    Hyperechoic lesion in the left hepatic lobe which measures mildly larger compared to prior.  Lesion is favored to represent a hemangioma.  Recommend multiphase CT or MRI for further characterization.    Computed MELD-Na score unavailable. Necessary lab results were not found in the last year.  Computed MELD score unavailable. Necessary lab results were not found in the last year.    WBC   Date Value Ref Range Status   12/16/2016 4.68 3.90 - 12.70 K/uL Final     Hemoglobin   Date Value Ref Range Status   12/16/2016 11.2 (L) 12.0 - 16.0 g/dL Final     Hematocrit   Date Value Ref Range Status   12/16/2016 36.8 (L) 37.0 - 48.5 % Final     Platelets   Date Value Ref  Range Status   12/16/2016 269 150 - 350 K/uL Final     BUN, Bld   Date Value Ref Range Status   12/16/2016 16 6 - 20 mg/dL Final     Creatinine   Date Value Ref Range Status   12/16/2016 1.0 0.5 - 1.4 mg/dL Final     Glucose   Date Value Ref Range Status   12/16/2016 84 70 - 110 mg/dL Final     Calcium   Date Value Ref Range Status   12/16/2016 9.5 8.7 - 10.5 mg/dL Final     Sodium   Date Value Ref Range Status   12/16/2016 141 136 - 145 mmol/L Final     Potassium   Date Value Ref Range Status   12/16/2016 3.5 3.5 - 5.1 mmol/L Final     Chloride   Date Value Ref Range Status   12/16/2016 105 95 - 110 mmol/L Final     AST   Date Value Ref Range Status   12/16/2016 14 10 - 40 U/L Final     ALT   Date Value Ref Range Status   12/16/2016 9 (L) 10 - 44 U/L Final     Alkaline Phosphatase   Date Value Ref Range Status   12/16/2016 96 55 - 135 U/L Final     Total Bilirubin   Date Value Ref Range Status   12/16/2016 0.4 0.1 - 1.0 mg/dL Final     Comment:     For infants and newborns, interpretation of results should be based  on gestational age, weight and in agreement with clinical  observations.  Premature Infant recommended reference ranges:  Up to 24 hours.............<8.0 mg/dL  Up to 48 hours............<12.0 mg/dL  3-5 days..................<15.0 mg/dL  6-29 days.................<15.0 mg/dL       Albumin   Date Value Ref Range Status   12/16/2016 4.1 3.5 - 5.2 g/dL Final     No results found for: INR      Assessment/Plan:     1. Hemangioma    2. Liver lesion    3. Autoimmune hepatitis      Debbie Mackenzie is a 53 y.o. female withAutoimmune Hepatitis    Hemangioma-highly suspect patient has a hemangioma which is likely benign.  Given question of interval growth will evaluate further with CT scan  -     CT Abdomen With Without Contrast; Future; Expected date: 02/01/2019    Liver lesion  -     CT Abdomen With Without Contrast; Future; Expected date: 02/01/2019    Autoimmune hepatitis-patient had outside labs; awaiting  results from today but suspect disease under control  -continue current medications  -monitor labs every 6 months  -     Comprehensive metabolic panel; Future; Expected date: 02/01/2019  -     CBC auto differential; Future; Expected date: 02/01/2019    Return to clinic in 6 months    Addendum: Liver tests normal    Liv Dempsey MD

## 2019-03-01 ENCOUNTER — TELEPHONE (OUTPATIENT)
Dept: RADIOLOGY | Facility: HOSPITAL | Age: 54
End: 2019-03-01

## 2019-03-04 ENCOUNTER — HOSPITAL ENCOUNTER (OUTPATIENT)
Dept: RADIOLOGY | Facility: HOSPITAL | Age: 54
Discharge: HOME | End: 2019-03-04
Attending: INTERNAL MEDICINE
Payer: COMMERCIAL

## 2019-03-04 DIAGNOSIS — D18.00 HEMANGIOMA: ICD-10-CM

## 2019-03-04 DIAGNOSIS — K76.9 LIVER LESION: ICD-10-CM

## 2019-03-04 PROCEDURE — 74160 CT ABDOMEN WITH CONTRAST: ICD-10-PCS | Mod: 26,,, | Performed by: RADIOLOGY

## 2019-03-04 PROCEDURE — 74160 CT ABDOMEN W/CONTRAST: CPT | Mod: 26,,, | Performed by: RADIOLOGY

## 2019-03-04 PROCEDURE — 25500020 PHARM REV CODE 255: Performed by: INTERNAL MEDICINE

## 2019-03-04 PROCEDURE — 74160 CT ABDOMEN W/CONTRAST: CPT | Mod: TC

## 2019-03-04 RX ADMIN — IOHEXOL 100 ML: 350 INJECTION, SOLUTION INTRAVENOUS at 09:03

## 2019-03-25 DIAGNOSIS — K21.9 GASTROESOPHAGEAL REFLUX DISEASE, ESOPHAGITIS PRESENCE NOT SPECIFIED: ICD-10-CM

## 2019-03-26 RX ORDER — PANTOPRAZOLE SODIUM 40 MG/1
TABLET, DELAYED RELEASE ORAL
Qty: 90 TABLET | Refills: 1 | Status: SHIPPED | OUTPATIENT
Start: 2019-03-26 | End: 2019-03-28 | Stop reason: SDUPTHER

## 2019-03-28 RX ORDER — PANTOPRAZOLE SODIUM 40 MG/1
40 TABLET, DELAYED RELEASE ORAL DAILY
Qty: 90 TABLET | Refills: 1 | Status: SHIPPED | OUTPATIENT
Start: 2019-03-28 | End: 2019-03-29 | Stop reason: SDUPTHER

## 2019-03-28 NOTE — TELEPHONE ENCOUNTER
Dr. Dempsey may you please resend to patient's pharmacy she is saying she did not receive it. THank you

## 2019-03-29 DIAGNOSIS — K21.9 GASTROESOPHAGEAL REFLUX DISEASE, ESOPHAGITIS PRESENCE NOT SPECIFIED: ICD-10-CM

## 2019-03-29 RX ORDER — PANTOPRAZOLE SODIUM 40 MG/1
40 TABLET, DELAYED RELEASE ORAL DAILY
Qty: 90 TABLET | Refills: 1 | Status: SHIPPED | OUTPATIENT
Start: 2019-03-29 | End: 2020-06-16

## 2019-07-01 DIAGNOSIS — K75.4 AUTOIMMUNE HEPATITIS: ICD-10-CM

## 2019-07-01 RX ORDER — MYCOPHENOLATE MOFETIL 500 MG/1
TABLET ORAL
Qty: 180 TABLET | Refills: 1 | Status: SHIPPED | OUTPATIENT
Start: 2019-07-01 | End: 2019-12-30

## 2019-07-08 ENCOUNTER — TELEPHONE (OUTPATIENT)
Dept: GASTROENTEROLOGY | Facility: CLINIC | Age: 54
End: 2019-07-08

## 2019-07-08 ENCOUNTER — PATIENT MESSAGE (OUTPATIENT)
Dept: GASTROENTEROLOGY | Facility: CLINIC | Age: 54
End: 2019-07-08

## 2019-07-08 NOTE — TELEPHONE ENCOUNTER
----- Message from Max Reza sent at 7/8/2019  8:46 AM CDT -----  Contact: pt   Type:  Needs Medical Advice    Who Called: RYLEE ASHRAF   Symptoms (please be specific):  How long has patient had these symptoms:   Pharmacy name and phone #:    Would the patient rather a call back or a response via [facility]? Call   Best Call Back Number: [phone] (home)    Additional Information: pt is requesting a call back from the nurse in regards to the pt getting her lab orders fax to [phone] to [facility]

## 2019-07-18 ENCOUNTER — OFFICE VISIT (OUTPATIENT)
Dept: GASTROENTEROLOGY | Facility: CLINIC | Age: 54
End: 2019-07-18
Payer: COMMERCIAL

## 2019-07-18 VITALS
HEIGHT: 64 IN | WEIGHT: 147.69 LBS | DIASTOLIC BLOOD PRESSURE: 72 MMHG | BODY MASS INDEX: 25.21 KG/M2 | HEART RATE: 60 BPM | SYSTOLIC BLOOD PRESSURE: 110 MMHG

## 2019-07-18 DIAGNOSIS — K75.4 AUTOIMMUNE HEPATITIS: Primary | ICD-10-CM

## 2019-07-18 PROCEDURE — 99999 PR PBB SHADOW E&M-EST. PATIENT-LVL III: CPT | Mod: PBBFAC,,, | Performed by: INTERNAL MEDICINE

## 2019-07-18 PROCEDURE — 3008F PR BODY MASS INDEX (BMI) DOCUMENTED: ICD-10-PCS | Mod: CPTII,S$GLB,, | Performed by: INTERNAL MEDICINE

## 2019-07-18 PROCEDURE — 3008F BODY MASS INDEX DOCD: CPT | Mod: CPTII,S$GLB,, | Performed by: INTERNAL MEDICINE

## 2019-07-18 PROCEDURE — 99999 PR PBB SHADOW E&M-EST. PATIENT-LVL III: ICD-10-PCS | Mod: PBBFAC,,, | Performed by: INTERNAL MEDICINE

## 2019-07-18 PROCEDURE — 99213 PR OFFICE/OUTPT VISIT, EST, LEVL III, 20-29 MIN: ICD-10-PCS | Mod: S$GLB,,, | Performed by: INTERNAL MEDICINE

## 2019-07-18 PROCEDURE — 99213 OFFICE O/P EST LOW 20 MIN: CPT | Mod: S$GLB,,, | Performed by: INTERNAL MEDICINE

## 2019-07-18 RX ORDER — DEXTROAMPHETAMINE SACCHARATE, AMPHETAMINE ASPARTATE MONOHYDRATE, DEXTROAMPHETAMINE SULFATE AND AMPHETAMINE SULFATE 2.5; 2.5; 2.5; 2.5 MG/1; MG/1; MG/1; MG/1
20 CAPSULE, EXTENDED RELEASE ORAL EVERY MORNING
COMMUNITY
End: 2023-07-24

## 2019-07-18 RX ORDER — MEDROXYPROGESTERONE ACETATE 2.5 MG/1
2.5 TABLET ORAL DAILY
Refills: 12 | COMMUNITY
Start: 2019-07-15

## 2019-07-18 NOTE — PROGRESS NOTES
Subjective:     Debbie Mackenzie is here for follow up of autoimmune hepatitis      HPI  Since Debbie Mackenzie's last visit she was started on Adderall because of her issues with fatigue. She reports it is helping a lot.  Otherwise she has been doing well.    No evidence of liver decompensation: no ascites, confusion or GI bleeding.      Outside records reviewed    Labs from July 9, 2019 showed creatinine of 0.9, albumin 4.3, alkaline phosphatase 63, ALT 15, AST 23, white blood cell count 5.4, hemoglobin 11.2, platelet count 237    Review of Systems    Objective:     Physical Exam   Constitutional: She is oriented to person, place, and time. She appears well-developed and well-nourished. No distress.   HENT:   Head: Normocephalic and atraumatic.   Mouth/Throat: Oropharynx is clear and moist. No oropharyngeal exudate.   Eyes: Pupils are equal, round, and reactive to light. Conjunctivae are normal. Right eye exhibits no discharge. Left eye exhibits no discharge. No scleral icterus.   Pulmonary/Chest: Effort normal and breath sounds normal. No respiratory distress. She has no wheezes.   Abdominal: Soft. She exhibits no distension. There is no tenderness.   Musculoskeletal: She exhibits no edema.   Neurological: She is alert and oriented to person, place, and time.   Psychiatric: She has a normal mood and affect. Her behavior is normal.   Vitals reviewed.      Computed MELD-Na score unavailable. Necessary lab results were not found in the last year.  Computed MELD score unavailable. Necessary lab results were not found in the last year.    WBC   Date Value Ref Range Status   12/16/2016 4.68 3.90 - 12.70 K/uL Final     Hemoglobin   Date Value Ref Range Status   12/16/2016 11.2 (L) 12.0 - 16.0 g/dL Final     Hematocrit   Date Value Ref Range Status   12/16/2016 36.8 (L) 37.0 - 48.5 % Final     Platelets   Date Value Ref Range Status   12/16/2016 269 150 - 350 K/uL Final     BUN, Bld   Date Value Ref Range Status    12/16/2016 16 6 - 20 mg/dL Final     Creatinine   Date Value Ref Range Status   12/16/2016 1.0 0.5 - 1.4 mg/dL Final     Glucose   Date Value Ref Range Status   12/16/2016 84 70 - 110 mg/dL Final     Calcium   Date Value Ref Range Status   12/16/2016 9.5 8.7 - 10.5 mg/dL Final     Sodium   Date Value Ref Range Status   12/16/2016 141 136 - 145 mmol/L Final     Potassium   Date Value Ref Range Status   12/16/2016 3.5 3.5 - 5.1 mmol/L Final     Chloride   Date Value Ref Range Status   12/16/2016 105 95 - 110 mmol/L Final     AST   Date Value Ref Range Status   12/16/2016 14 10 - 40 U/L Final     ALT   Date Value Ref Range Status   12/16/2016 9 (L) 10 - 44 U/L Final     Alkaline Phosphatase   Date Value Ref Range Status   12/16/2016 96 55 - 135 U/L Final     Total Bilirubin   Date Value Ref Range Status   12/16/2016 0.4 0.1 - 1.0 mg/dL Final     Comment:     For infants and newborns, interpretation of results should be based  on gestational age, weight and in agreement with clinical  observations.  Premature Infant recommended reference ranges:  Up to 24 hours.............<8.0 mg/dL  Up to 48 hours............<12.0 mg/dL  3-5 days..................<15.0 mg/dL  6-29 days.................<15.0 mg/dL       Albumin   Date Value Ref Range Status   12/16/2016 4.1 3.5 - 5.2 g/dL Final     No results found for: INR      Assessment/Plan:     1. Autoimmune hepatitis      Debbie Mackenzie is a 54 y.o. female withautoimmune hepatitis    Autoimmune hepatitis-labs have been normal.  Disease seems to be under control.  -continue current medication  -Check fibroscan at next visit  -     US Elastography Liver; Future; Expected date: 07/18/2019      Return to clinic in 6 months with preclinic labs and fibroscan    Liv Dempsey MD

## 2019-12-30 DIAGNOSIS — K75.4 AUTOIMMUNE HEPATITIS: ICD-10-CM

## 2019-12-30 RX ORDER — MYCOPHENOLATE MOFETIL 500 MG/1
500 TABLET ORAL 2 TIMES DAILY
Qty: 180 TABLET | Refills: 3 | Status: SHIPPED | OUTPATIENT
Start: 2019-12-30 | End: 2020-12-22

## 2020-01-20 ENCOUNTER — OFFICE VISIT (OUTPATIENT)
Dept: GASTROENTEROLOGY | Facility: CLINIC | Age: 55
End: 2020-01-20
Payer: COMMERCIAL

## 2020-01-20 ENCOUNTER — PROCEDURE VISIT (OUTPATIENT)
Dept: GASTROENTEROLOGY | Facility: CLINIC | Age: 55
End: 2020-01-20
Attending: INTERNAL MEDICINE
Payer: COMMERCIAL

## 2020-01-20 VITALS
HEIGHT: 64 IN | HEART RATE: 72 BPM | WEIGHT: 147.94 LBS | SYSTOLIC BLOOD PRESSURE: 126 MMHG | DIASTOLIC BLOOD PRESSURE: 82 MMHG | BODY MASS INDEX: 25.25 KG/M2

## 2020-01-20 VITALS
SYSTOLIC BLOOD PRESSURE: 126 MMHG | DIASTOLIC BLOOD PRESSURE: 82 MMHG | BODY MASS INDEX: 25.25 KG/M2 | WEIGHT: 147.94 LBS | HEART RATE: 72 BPM | HEIGHT: 64 IN

## 2020-01-20 DIAGNOSIS — K75.4 AUTOIMMUNE HEPATITIS: ICD-10-CM

## 2020-01-20 DIAGNOSIS — K75.4 AUTOIMMUNE HEPATITIS: Primary | ICD-10-CM

## 2020-01-20 PROCEDURE — 99999 PR PBB SHADOW E&M-EST. PATIENT-LVL III: CPT | Mod: PBBFAC,,, | Performed by: INTERNAL MEDICINE

## 2020-01-20 PROCEDURE — 99213 PR OFFICE/OUTPT VISIT, EST, LEVL III, 20-29 MIN: ICD-10-PCS | Mod: S$GLB,,, | Performed by: INTERNAL MEDICINE

## 2020-01-20 PROCEDURE — 3008F PR BODY MASS INDEX (BMI) DOCUMENTED: ICD-10-PCS | Mod: CPTII,S$GLB,, | Performed by: INTERNAL MEDICINE

## 2020-01-20 PROCEDURE — 3008F BODY MASS INDEX DOCD: CPT | Mod: CPTII,S$GLB,, | Performed by: INTERNAL MEDICINE

## 2020-01-20 PROCEDURE — 91200 PR LIVER ELASTOGRAPHY W/OUT IMAG W/INTERP & REPORT: ICD-10-PCS | Mod: S$GLB,,, | Performed by: INTERNAL MEDICINE

## 2020-01-20 PROCEDURE — 91200 LIVER ELASTOGRAPHY: CPT | Mod: S$GLB,,, | Performed by: INTERNAL MEDICINE

## 2020-01-20 PROCEDURE — 99999 PR PBB SHADOW E&M-EST. PATIENT-LVL III: ICD-10-PCS | Mod: PBBFAC,,, | Performed by: INTERNAL MEDICINE

## 2020-01-20 PROCEDURE — 99213 OFFICE O/P EST LOW 20 MIN: CPT | Mod: S$GLB,,, | Performed by: INTERNAL MEDICINE

## 2020-01-20 RX ORDER — NEBIVOLOL HYDROCHLORIDE 5 MG/1
5 TABLET ORAL DAILY
COMMUNITY
Start: 2019-11-01

## 2020-01-20 NOTE — PROCEDURES
Procedures     Fibroscan Procedure     Name: Debbie Mackenzie  Date of Procedure : 2020   :: Liv Dempsey MD  Diagnosis: other    Probe: M    Fibroscan readin.1 KPa    Fibrosis:F 0-1     CAP readin dB/m    Steatosis: :S2       Interpretation: Minimal fibrosis with moderate steatosis

## 2020-01-20 NOTE — PROGRESS NOTES
Subjective:     Debbie Mackenzie is here for follow up of Autoimmune hepatitis      HPI  Since Debbie Mackenzie's last visit she denies any acute issues.  She continues to have issues with chronic fatigue for which she is followed up with her primary doctor neurologist.    Review of Systems    Objective:     Physical Exam   Constitutional: She is oriented to person, place, and time. She appears well-developed and well-nourished. No distress.   HENT:   Head: Normocephalic and atraumatic.   Mouth/Throat: Oropharynx is clear and moist. No oropharyngeal exudate.   Eyes: Pupils are equal, round, and reactive to light. Conjunctivae are normal. Right eye exhibits no discharge. Left eye exhibits no discharge. No scleral icterus.   Pulmonary/Chest: Effort normal and breath sounds normal. No respiratory distress. She has no wheezes.   Abdominal: Soft. She exhibits no distension. There is no tenderness.   Musculoskeletal: She exhibits no edema.   Neurological: She is alert and oriented to person, place, and time.   Psychiatric: She has a normal mood and affect. Her behavior is normal.   Vitals reviewed.      Computed MELD-Na score unavailable. Necessary lab results were not found in the last year.  Computed MELD score unavailable. Necessary lab results were not found in the last year.    WBC   Date Value Ref Range Status   12/16/2016 4.68 3.90 - 12.70 K/uL Final     Hemoglobin   Date Value Ref Range Status   12/16/2016 11.2 (L) 12.0 - 16.0 g/dL Final     Hematocrit   Date Value Ref Range Status   12/16/2016 36.8 (L) 37.0 - 48.5 % Final     Platelets   Date Value Ref Range Status   12/16/2016 269 150 - 350 K/uL Final     BUN, Bld   Date Value Ref Range Status   12/16/2016 16 6 - 20 mg/dL Final     Creatinine   Date Value Ref Range Status   12/16/2016 1.0 0.5 - 1.4 mg/dL Final     Glucose   Date Value Ref Range Status   12/16/2016 84 70 - 110 mg/dL Final     Calcium   Date Value Ref Range Status   12/16/2016 9.5 8.7 - 10.5  mg/dL Final     Sodium   Date Value Ref Range Status   12/16/2016 141 136 - 145 mmol/L Final     Potassium   Date Value Ref Range Status   12/16/2016 3.5 3.5 - 5.1 mmol/L Final     Chloride   Date Value Ref Range Status   12/16/2016 105 95 - 110 mmol/L Final     AST   Date Value Ref Range Status   12/16/2016 14 10 - 40 U/L Final     ALT   Date Value Ref Range Status   12/16/2016 9 (L) 10 - 44 U/L Final     Alkaline Phosphatase   Date Value Ref Range Status   12/16/2016 96 55 - 135 U/L Final     Total Bilirubin   Date Value Ref Range Status   12/16/2016 0.4 0.1 - 1.0 mg/dL Final     Comment:     For infants and newborns, interpretation of results should be based  on gestational age, weight and in agreement with clinical  observations.  Premature Infant recommended reference ranges:  Up to 24 hours.............<8.0 mg/dL  Up to 48 hours............<12.0 mg/dL  3-5 days..................<15.0 mg/dL  6-29 days.................<15.0 mg/dL       Albumin   Date Value Ref Range Status   12/16/2016 4.1 3.5 - 5.2 g/dL Final     No results found for: INR      Assessment/Plan:     1. Autoimmune hepatitis      Debbie Mackenzie is a 54 y.o. female withAutoimmune hepatitis    Autoimmune hepatitis-awaiting labs that patient had done in December but overall suspect liver disease has been stable. Her fibroscan did not show any significant fibrosis only steatosis.  -continue to monitor labs  -discussed importance of diet and exercise for weight loss  -     Comprehensive metabolic panel; Standing  -     CBC auto differential; Standing    Return to clinic in 6 months with preclinic labs      Liv Dempsey MD

## 2020-01-31 ENCOUNTER — TELEPHONE (OUTPATIENT)
Dept: GASTROENTEROLOGY | Facility: CLINIC | Age: 55
End: 2020-01-31

## 2020-01-31 NOTE — TELEPHONE ENCOUNTER
Patient external labs were faxed to [facility] at [phone]. Fax confirmation was received on 01/28/2020.

## 2020-06-23 ENCOUNTER — TELEPHONE (OUTPATIENT)
Dept: GASTROENTEROLOGY | Facility: CLINIC | Age: 55
End: 2020-06-23

## 2020-06-23 NOTE — TELEPHONE ENCOUNTER
----- Message from Kolby Sharp sent at 6/23/2020  3:14 PM CDT -----  Contact: self  Would like to consult with nurse regarding rescheduling appt.  Please contact Debbie Mackenzie @ [phone].  Thanks/As

## 2020-06-23 NOTE — TELEPHONE ENCOUNTER
Spoke with patient and states that she will keep her appointment with Dr. Liv Dempsey as scheduled on 07/01/2020

## 2020-07-01 ENCOUNTER — OFFICE VISIT (OUTPATIENT)
Dept: GASTROENTEROLOGY | Facility: CLINIC | Age: 55
End: 2020-07-01
Payer: COMMERCIAL

## 2020-07-01 VITALS
HEART RATE: 76 BPM | BODY MASS INDEX: 25.63 KG/M2 | WEIGHT: 150.13 LBS | SYSTOLIC BLOOD PRESSURE: 126 MMHG | HEIGHT: 64 IN | DIASTOLIC BLOOD PRESSURE: 82 MMHG

## 2020-07-01 DIAGNOSIS — K75.4 AUTOIMMUNE HEPATITIS: Primary | ICD-10-CM

## 2020-07-01 PROCEDURE — 99213 OFFICE O/P EST LOW 20 MIN: CPT | Mod: S$GLB,,, | Performed by: INTERNAL MEDICINE

## 2020-07-01 PROCEDURE — 99999 PR PBB SHADOW E&M-EST. PATIENT-LVL IV: ICD-10-PCS | Mod: PBBFAC,,, | Performed by: INTERNAL MEDICINE

## 2020-07-01 PROCEDURE — 3008F PR BODY MASS INDEX (BMI) DOCUMENTED: ICD-10-PCS | Mod: CPTII,S$GLB,, | Performed by: INTERNAL MEDICINE

## 2020-07-01 PROCEDURE — 99213 PR OFFICE/OUTPT VISIT, EST, LEVL III, 20-29 MIN: ICD-10-PCS | Mod: S$GLB,,, | Performed by: INTERNAL MEDICINE

## 2020-07-01 PROCEDURE — 99999 PR PBB SHADOW E&M-EST. PATIENT-LVL IV: CPT | Mod: PBBFAC,,, | Performed by: INTERNAL MEDICINE

## 2020-07-01 PROCEDURE — 3008F BODY MASS INDEX DOCD: CPT | Mod: CPTII,S$GLB,, | Performed by: INTERNAL MEDICINE

## 2020-07-01 RX ORDER — AZELASTINE 1 MG/ML
SPRAY, METERED NASAL
COMMUNITY
Start: 2020-06-01

## 2020-07-01 NOTE — PROGRESS NOTES
Subjective:     Debbie Mackenzie is here for follow up of Autoimmune hepatitis      HPI  Since Debbie Mackenzie's last visit she denies any acute issues.     No evidence of liver decompensation: no ascites, confusion or GI bleeding.      Outside labs reviewed from June 25, 2020 AST 16 ALT 24, mild anemia with hemoglobin 11.  No other major findings.    Review of Systems    Objective:     Physical Exam  Vitals signs reviewed.   Constitutional:       General: She is not in acute distress.     Appearance: She is well-developed.   HENT:      Head: Normocephalic and atraumatic.      Mouth/Throat:      Pharynx: No oropharyngeal exudate.   Eyes:      General: No scleral icterus.        Right eye: No discharge.         Left eye: No discharge.      Conjunctiva/sclera: Conjunctivae normal.      Pupils: Pupils are equal, round, and reactive to light.   Pulmonary:      Effort: Pulmonary effort is normal. No respiratory distress.      Breath sounds: Normal breath sounds. No wheezing.   Abdominal:      General: There is no distension.      Palpations: Abdomen is soft.      Tenderness: There is no abdominal tenderness.   Neurological:      Mental Status: She is alert and oriented to person, place, and time.   Psychiatric:         Behavior: Behavior normal.         Computed MELD-Na score unavailable. Necessary lab results were not found in the last year.  Computed MELD score unavailable. Necessary lab results were not found in the last year.    WBC   Date Value Ref Range Status   12/16/2016 4.68 3.90 - 12.70 K/uL Final     Hemoglobin   Date Value Ref Range Status   12/16/2016 11.2 (L) 12.0 - 16.0 g/dL Final     Hematocrit   Date Value Ref Range Status   12/16/2016 36.8 (L) 37.0 - 48.5 % Final     Platelets   Date Value Ref Range Status   12/16/2016 269 150 - 350 K/uL Final     BUN, Bld   Date Value Ref Range Status   12/16/2016 16 6 - 20 mg/dL Final     Creatinine   Date Value Ref Range Status   12/16/2016 1.0 0.5 - 1.4 mg/dL  Final     Glucose   Date Value Ref Range Status   12/16/2016 84 70 - 110 mg/dL Final     Calcium   Date Value Ref Range Status   12/16/2016 9.5 8.7 - 10.5 mg/dL Final     Sodium   Date Value Ref Range Status   12/16/2016 141 136 - 145 mmol/L Final     Potassium   Date Value Ref Range Status   12/16/2016 3.5 3.5 - 5.1 mmol/L Final     Chloride   Date Value Ref Range Status   12/16/2016 105 95 - 110 mmol/L Final     AST   Date Value Ref Range Status   12/16/2016 14 10 - 40 U/L Final     ALT   Date Value Ref Range Status   12/16/2016 9 (L) 10 - 44 U/L Final     Alkaline Phosphatase   Date Value Ref Range Status   12/16/2016 96 55 - 135 U/L Final     Total Bilirubin   Date Value Ref Range Status   12/16/2016 0.4 0.1 - 1.0 mg/dL Final     Comment:     For infants and newborns, interpretation of results should be based  on gestational age, weight and in agreement with clinical  observations.  Premature Infant recommended reference ranges:  Up to 24 hours.............<8.0 mg/dL  Up to 48 hours............<12.0 mg/dL  3-5 days..................<15.0 mg/dL  6-29 days.................<15.0 mg/dL       Albumin   Date Value Ref Range Status   12/16/2016 4.1 3.5 - 5.2 g/dL Final     No results found for: INR      Assessment/Plan:     1. Autoimmune hepatitis      Debbie Mackenzie is a 55 y.o. female withAutoimmune hepatitis    Autoimmune hepatitis-disease is controlled.  -Continue current medications  -Patient doing very well so we can space out clinic visits  -     Comprehensive metabolic panel; Standing  -     CBC auto differential; Standing    RTC in 1 year, continue with labs every 6 months    Outside labs reviewed from July 19, 2021 shows a sodium of 138, potassium 4.0, BUN 18, creatinine 0.9, total protein 7.6, albumin 4.4, alkaline phosphatase 56, ALT 17, AST 13, total bilirubin 0.4, hemoglobin 12.3, platelet count 272, white blood cell count 8.8    Liv L Ke, MD

## 2020-12-07 DIAGNOSIS — K21.9 GASTROESOPHAGEAL REFLUX DISEASE: ICD-10-CM

## 2020-12-07 RX ORDER — PANTOPRAZOLE SODIUM 40 MG/1
TABLET, DELAYED RELEASE ORAL
Qty: 90 TABLET | Refills: 3 | Status: SHIPPED | OUTPATIENT
Start: 2020-12-07 | End: 2021-12-06

## 2021-02-05 ENCOUNTER — PATIENT MESSAGE (OUTPATIENT)
Dept: GASTROENTEROLOGY | Facility: CLINIC | Age: 56
End: 2021-02-05

## 2021-05-12 ENCOUNTER — PATIENT MESSAGE (OUTPATIENT)
Dept: RESEARCH | Facility: HOSPITAL | Age: 56
End: 2021-05-12

## 2021-05-13 ENCOUNTER — TELEPHONE (OUTPATIENT)
Dept: HEPATOLOGY | Facility: CLINIC | Age: 56
End: 2021-05-13

## 2021-06-11 LAB — BCS RECOMMENDATION EXT: NORMAL

## 2021-06-29 DIAGNOSIS — K75.4 AUTOIMMUNE HEPATITIS: ICD-10-CM

## 2021-06-30 RX ORDER — MYCOPHENOLATE MOFETIL 500 MG/1
500 TABLET ORAL 2 TIMES DAILY
Qty: 180 TABLET | Refills: 1 | OUTPATIENT
Start: 2021-06-30

## 2021-07-15 ENCOUNTER — PATIENT MESSAGE (OUTPATIENT)
Dept: HEPATOLOGY | Facility: CLINIC | Age: 56
End: 2021-07-15

## 2021-07-16 ENCOUNTER — TELEPHONE (OUTPATIENT)
Dept: HEPATOLOGY | Facility: CLINIC | Age: 56
End: 2021-07-16

## 2021-08-02 ENCOUNTER — OFFICE VISIT (OUTPATIENT)
Dept: HEPATOLOGY | Facility: CLINIC | Age: 56
End: 2021-08-02
Payer: COMMERCIAL

## 2021-08-02 VITALS
DIASTOLIC BLOOD PRESSURE: 70 MMHG | WEIGHT: 159.81 LBS | SYSTOLIC BLOOD PRESSURE: 122 MMHG | HEART RATE: 68 BPM | HEIGHT: 64 IN | BODY MASS INDEX: 27.28 KG/M2

## 2021-08-02 DIAGNOSIS — E55.9 VITAMIN D DEFICIENCY: ICD-10-CM

## 2021-08-02 DIAGNOSIS — K75.4 AUTOIMMUNE HEPATITIS: Primary | ICD-10-CM

## 2021-08-02 PROCEDURE — 3074F PR MOST RECENT SYSTOLIC BLOOD PRESSURE < 130 MM HG: ICD-10-PCS | Mod: CPTII,S$GLB,, | Performed by: INTERNAL MEDICINE

## 2021-08-02 PROCEDURE — 1159F MED LIST DOCD IN RCRD: CPT | Mod: CPTII,S$GLB,, | Performed by: INTERNAL MEDICINE

## 2021-08-02 PROCEDURE — 99999 PR PBB SHADOW E&M-EST. PATIENT-LVL IV: ICD-10-PCS | Mod: PBBFAC,,, | Performed by: INTERNAL MEDICINE

## 2021-08-02 PROCEDURE — 3078F DIAST BP <80 MM HG: CPT | Mod: CPTII,S$GLB,, | Performed by: INTERNAL MEDICINE

## 2021-08-02 PROCEDURE — 3008F PR BODY MASS INDEX (BMI) DOCUMENTED: ICD-10-PCS | Mod: CPTII,S$GLB,, | Performed by: INTERNAL MEDICINE

## 2021-08-02 PROCEDURE — 1160F RVW MEDS BY RX/DR IN RCRD: CPT | Mod: CPTII,S$GLB,, | Performed by: INTERNAL MEDICINE

## 2021-08-02 PROCEDURE — 99213 OFFICE O/P EST LOW 20 MIN: CPT | Mod: S$GLB,,, | Performed by: INTERNAL MEDICINE

## 2021-08-02 PROCEDURE — 99999 PR PBB SHADOW E&M-EST. PATIENT-LVL IV: CPT | Mod: PBBFAC,,, | Performed by: INTERNAL MEDICINE

## 2021-08-02 PROCEDURE — 3074F SYST BP LT 130 MM HG: CPT | Mod: CPTII,S$GLB,, | Performed by: INTERNAL MEDICINE

## 2021-08-02 PROCEDURE — 3078F PR MOST RECENT DIASTOLIC BLOOD PRESSURE < 80 MM HG: ICD-10-PCS | Mod: CPTII,S$GLB,, | Performed by: INTERNAL MEDICINE

## 2021-08-02 PROCEDURE — 99213 PR OFFICE/OUTPT VISIT, EST, LEVL III, 20-29 MIN: ICD-10-PCS | Mod: S$GLB,,, | Performed by: INTERNAL MEDICINE

## 2021-08-02 PROCEDURE — 1126F AMNT PAIN NOTED NONE PRSNT: CPT | Mod: CPTII,S$GLB,, | Performed by: INTERNAL MEDICINE

## 2021-08-02 PROCEDURE — 1126F PR PAIN SEVERITY QUANTIFIED, NO PAIN PRESENT: ICD-10-PCS | Mod: CPTII,S$GLB,, | Performed by: INTERNAL MEDICINE

## 2021-08-02 PROCEDURE — 3008F BODY MASS INDEX DOCD: CPT | Mod: CPTII,S$GLB,, | Performed by: INTERNAL MEDICINE

## 2021-08-02 PROCEDURE — 1160F PR REVIEW ALL MEDS BY PRESCRIBER/CLIN PHARMACIST DOCUMENTED: ICD-10-PCS | Mod: CPTII,S$GLB,, | Performed by: INTERNAL MEDICINE

## 2021-08-02 PROCEDURE — 1159F PR MEDICATION LIST DOCUMENTED IN MEDICAL RECORD: ICD-10-PCS | Mod: CPTII,S$GLB,, | Performed by: INTERNAL MEDICINE

## 2021-08-02 RX ORDER — MONTELUKAST SODIUM 10 MG/1
10 TABLET ORAL DAILY
COMMUNITY
Start: 2021-03-25

## 2021-12-21 ENCOUNTER — PATIENT MESSAGE (OUTPATIENT)
Dept: NEUROLOGY | Facility: CLINIC | Age: 56
End: 2021-12-21
Payer: COMMERCIAL

## 2022-02-28 ENCOUNTER — PATIENT MESSAGE (OUTPATIENT)
Dept: PSYCHIATRY | Facility: CLINIC | Age: 57
End: 2022-02-28
Payer: COMMERCIAL

## 2022-03-16 ENCOUNTER — PATIENT MESSAGE (OUTPATIENT)
Dept: HEPATOLOGY | Facility: CLINIC | Age: 57
End: 2022-03-16
Payer: COMMERCIAL

## 2022-04-11 ENCOUNTER — HISTORICAL (OUTPATIENT)
Dept: ADMINISTRATIVE | Facility: HOSPITAL | Age: 57
End: 2022-04-11
Payer: COMMERCIAL

## 2022-04-21 ENCOUNTER — HOSPITAL ENCOUNTER (OUTPATIENT)
Dept: RADIOLOGY | Facility: HOSPITAL | Age: 57
Discharge: HOME | End: 2022-04-21
Attending: INTERNAL MEDICINE
Payer: COMMERCIAL

## 2022-04-21 DIAGNOSIS — K75.4 AUTOIMMUNE HEPATITIS: ICD-10-CM

## 2022-04-21 PROCEDURE — 76705 ECHO EXAM OF ABDOMEN: CPT | Mod: TC

## 2022-04-21 PROCEDURE — 76705 ECHO EXAM OF ABDOMEN: CPT | Mod: 26,,, | Performed by: RADIOLOGY

## 2022-04-21 PROCEDURE — 76705 US ABDOMEN LIMITED: ICD-10-PCS | Mod: 26,,, | Performed by: RADIOLOGY

## 2022-04-26 VITALS
WEIGHT: 164.25 LBS | SYSTOLIC BLOOD PRESSURE: 119 MMHG | BODY MASS INDEX: 28.04 KG/M2 | DIASTOLIC BLOOD PRESSURE: 82 MMHG | HEIGHT: 64 IN

## 2022-04-28 ENCOUNTER — PATIENT MESSAGE (OUTPATIENT)
Dept: HEPATOLOGY | Facility: CLINIC | Age: 57
End: 2022-04-28
Payer: COMMERCIAL

## 2022-05-09 RX ORDER — MODAFINIL 100 MG/1
100 TABLET ORAL DAILY
Qty: 30 TABLET | Refills: 3 | Status: SHIPPED | OUTPATIENT
Start: 2022-05-09 | End: 2022-11-21

## 2022-05-09 RX ORDER — MODAFINIL 100 MG/1
100 TABLET ORAL
COMMUNITY
Start: 2021-12-29 | End: 2022-05-09 | Stop reason: SDUPTHER

## 2022-06-24 ENCOUNTER — PATIENT MESSAGE (OUTPATIENT)
Dept: PSYCHIATRY | Facility: CLINIC | Age: 57
End: 2022-06-24
Payer: COMMERCIAL

## 2022-07-13 ENCOUNTER — OFFICE VISIT (OUTPATIENT)
Dept: HEPATOLOGY | Facility: CLINIC | Age: 57
End: 2022-07-13
Payer: COMMERCIAL

## 2022-07-13 VITALS
WEIGHT: 166 LBS | HEIGHT: 64 IN | SYSTOLIC BLOOD PRESSURE: 126 MMHG | HEART RATE: 72 BPM | DIASTOLIC BLOOD PRESSURE: 70 MMHG | BODY MASS INDEX: 28.34 KG/M2

## 2022-07-13 DIAGNOSIS — E66.3 OVERWEIGHT (BMI 25.0-29.9): ICD-10-CM

## 2022-07-13 DIAGNOSIS — K75.4 AUTOIMMUNE HEPATITIS: Primary | ICD-10-CM

## 2022-07-13 DIAGNOSIS — G35 MS (MULTIPLE SCLEROSIS): ICD-10-CM

## 2022-07-13 DIAGNOSIS — K21.9 GASTROESOPHAGEAL REFLUX DISEASE, UNSPECIFIED WHETHER ESOPHAGITIS PRESENT: ICD-10-CM

## 2022-07-13 PROCEDURE — 1160F PR REVIEW ALL MEDS BY PRESCRIBER/CLIN PHARMACIST DOCUMENTED: ICD-10-PCS | Mod: CPTII,S$GLB,, | Performed by: INTERNAL MEDICINE

## 2022-07-13 PROCEDURE — 3074F PR MOST RECENT SYSTOLIC BLOOD PRESSURE < 130 MM HG: ICD-10-PCS | Mod: CPTII,S$GLB,, | Performed by: INTERNAL MEDICINE

## 2022-07-13 PROCEDURE — 3078F DIAST BP <80 MM HG: CPT | Mod: CPTII,S$GLB,, | Performed by: INTERNAL MEDICINE

## 2022-07-13 PROCEDURE — 1159F MED LIST DOCD IN RCRD: CPT | Mod: CPTII,S$GLB,, | Performed by: INTERNAL MEDICINE

## 2022-07-13 PROCEDURE — 1160F RVW MEDS BY RX/DR IN RCRD: CPT | Mod: CPTII,S$GLB,, | Performed by: INTERNAL MEDICINE

## 2022-07-13 PROCEDURE — 3008F PR BODY MASS INDEX (BMI) DOCUMENTED: ICD-10-PCS | Mod: CPTII,S$GLB,, | Performed by: INTERNAL MEDICINE

## 2022-07-13 PROCEDURE — 3008F BODY MASS INDEX DOCD: CPT | Mod: CPTII,S$GLB,, | Performed by: INTERNAL MEDICINE

## 2022-07-13 PROCEDURE — 99999 PR PBB SHADOW E&M-EST. PATIENT-LVL V: CPT | Mod: PBBFAC,,, | Performed by: INTERNAL MEDICINE

## 2022-07-13 PROCEDURE — 1159F PR MEDICATION LIST DOCUMENTED IN MEDICAL RECORD: ICD-10-PCS | Mod: CPTII,S$GLB,, | Performed by: INTERNAL MEDICINE

## 2022-07-13 PROCEDURE — 3074F SYST BP LT 130 MM HG: CPT | Mod: CPTII,S$GLB,, | Performed by: INTERNAL MEDICINE

## 2022-07-13 PROCEDURE — 3078F PR MOST RECENT DIASTOLIC BLOOD PRESSURE < 80 MM HG: ICD-10-PCS | Mod: CPTII,S$GLB,, | Performed by: INTERNAL MEDICINE

## 2022-07-13 PROCEDURE — 99999 PR PBB SHADOW E&M-EST. PATIENT-LVL V: ICD-10-PCS | Mod: PBBFAC,,, | Performed by: INTERNAL MEDICINE

## 2022-07-13 PROCEDURE — 99214 OFFICE O/P EST MOD 30 MIN: CPT | Mod: S$GLB,,, | Performed by: INTERNAL MEDICINE

## 2022-07-13 PROCEDURE — 99214 PR OFFICE/OUTPT VISIT, EST, LEVL IV, 30-39 MIN: ICD-10-PCS | Mod: S$GLB,,, | Performed by: INTERNAL MEDICINE

## 2022-07-13 NOTE — PROGRESS NOTES
Subjective:     Debbie Mackenzie is here for follow up of Autoimmune hepatitis      HPI  Since Debbie Mackenzie's last visit she denies any significant liver related issues.  She has been compliant with her medications.  Her main issues have been around her multiple sclerosis.  She reports her current doctor wants to refer her to a higher level of care.  She needs another MS specialist.  It appears she may have a new lesion in the spine.    She also has occasional issues with reflux.  For the most part her Protonix sufficient but occasionally she needs some extra medication she has 1 which she can take.    She has gained some weight since last visit.  She knowledge is that she has not been eating as well.    Outside records reviewed  CBC stable, vitamin-D normal; CMP was not drawn      Review of Systems   Constitutional: Positive for activity change and fatigue.   HENT: Negative.    Eyes: Negative.    Respiratory: Negative.    Cardiovascular: Negative.    Gastrointestinal:        GERD   Genitourinary: Negative.    Musculoskeletal: Positive for arthralgias and myalgias.   Skin: Negative.    Neurological: Negative.    Psychiatric/Behavioral: Negative.        Objective:     Physical Exam  Vitals reviewed.   Constitutional:       General: She is not in acute distress.     Appearance: She is well-developed.   HENT:      Head: Normocephalic and atraumatic.      Mouth/Throat:      Pharynx: No oropharyngeal exudate.   Eyes:      General: No scleral icterus.        Right eye: No discharge.         Left eye: No discharge.      Conjunctiva/sclera: Conjunctivae normal.      Pupils: Pupils are equal, round, and reactive to light.   Pulmonary:      Effort: Pulmonary effort is normal. No respiratory distress.      Breath sounds: Normal breath sounds. No wheezing.   Abdominal:      General: There is no distension.      Palpations: Abdomen is soft.      Tenderness: There is no abdominal tenderness.   Neurological:      Mental  Status: She is alert and oriented to person, place, and time.   Psychiatric:         Behavior: Behavior normal.         Computed MELD-Na score unavailable. Necessary lab results were not found in the last year.  Computed MELD score unavailable. Necessary lab results were not found in the last year.    WBC   Date Value Ref Range Status   12/16/2016 4.68 3.90 - 12.70 K/uL Final     Hemoglobin   Date Value Ref Range Status   12/16/2016 11.2 (L) 12.0 - 16.0 g/dL Final     Hematocrit   Date Value Ref Range Status   12/16/2016 36.8 (L) 37.0 - 48.5 % Final     Platelets   Date Value Ref Range Status   12/16/2016 269 150 - 350 K/uL Final     BUN   Date Value Ref Range Status   12/16/2016 16 6 - 20 mg/dL Final     Creatinine   Date Value Ref Range Status   12/16/2016 1.0 0.5 - 1.4 mg/dL Final     Glucose   Date Value Ref Range Status   12/16/2016 84 70 - 110 mg/dL Final     Calcium   Date Value Ref Range Status   12/16/2016 9.5 8.7 - 10.5 mg/dL Final     Sodium   Date Value Ref Range Status   12/16/2016 141 136 - 145 mmol/L Final     Potassium   Date Value Ref Range Status   12/16/2016 3.5 3.5 - 5.1 mmol/L Final     Chloride   Date Value Ref Range Status   12/16/2016 105 95 - 110 mmol/L Final     AST   Date Value Ref Range Status   12/16/2016 14 10 - 40 U/L Final     ALT   Date Value Ref Range Status   12/16/2016 9 (L) 10 - 44 U/L Final     Alkaline Phosphatase   Date Value Ref Range Status   12/16/2016 96 55 - 135 U/L Final     Total Bilirubin   Date Value Ref Range Status   12/16/2016 0.4 0.1 - 1.0 mg/dL Final     Comment:     For infants and newborns, interpretation of results should be based  on gestational age, weight and in agreement with clinical  observations.  Premature Infant recommended reference ranges:  Up to 24 hours.............<8.0 mg/dL  Up to 48 hours............<12.0 mg/dL  3-5 days..................<15.0 mg/dL  6-29 days.................<15.0 mg/dL       Albumin   Date Value Ref Range Status   12/16/2016  4.1 3.5 - 5.2 g/dL Final     No results found for: INR      Assessment/Plan:     1. Autoimmune hepatitis    2. MS (multiple sclerosis)    3. Overweight (BMI 25.0-29.9)    4. Gastroesophageal reflux disease, unspecified whether esophagitis present      Debbie Mackenzie is a 57 y.o. female withAutoimmune hepatitis    Autoimmune hepatitis-controlled on current therapy  -will need to get updated liver tests  -continue on CellCept  -     Comprehensive Metabolic Panel; Standing  -     CBC Auto Differential; Standing  -     Comprehensive Metabolic Panel; Future; Expected date: 07/13/2022    MS (multiple sclerosis)-uncontrolled  -would like to see if patient can appointment to see Dr. Ana Paula Kim in [place]  -     Ambulatory referral/consult to Neurology; Future; Expected date: 07/20/2022    Overweight (BMI 25.0-29.9)-discussed the importance of healthy diet and exercise for weight loss    GERD-not completely controlled  -advised patient that she can try Pepcid as needed  -if she has persistent symptoms may have to consider repeat upper endoscopy although patient has had issues with sedation and procedure intolerance given her ENT/upper respiratory issues    Continue with labs every 6 months and yearly visit      Liv Dempsey MD     Outside records reviewed    Labs from July 13, 2022 shows sodium of 142, potassium 3.5, creatinine 0.9, albumin 4.4, alkaline phosphatase 50, ALT 10, AST 14, total bilirubin 0.5

## 2022-07-19 ENCOUNTER — TELEPHONE (OUTPATIENT)
Dept: NEUROLOGY | Facility: CLINIC | Age: 57
End: 2022-07-19

## 2022-07-19 NOTE — TELEPHONE ENCOUNTER
----- Message from Saadia Gonzalez, Patient Care Assistant sent at 7/19/2022  9:51 AM CDT -----  Good morning,     Dr. Liv Dempsey placed a referral to this department regarding this patient. If possible, can someone call patient to schedule an appointment?     Thanks,   Saadia

## 2022-07-20 NOTE — TELEPHONE ENCOUNTER
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

## 2022-07-25 ENCOUNTER — PATIENT MESSAGE (OUTPATIENT)
Dept: HEPATOLOGY | Facility: CLINIC | Age: 57
End: 2022-07-25
Payer: COMMERCIAL

## 2022-07-25 ENCOUNTER — TELEPHONE (OUTPATIENT)
Dept: HEPATOLOGY | Facility: CLINIC | Age: 57
End: 2022-07-25
Payer: COMMERCIAL

## 2022-07-25 NOTE — TELEPHONE ENCOUNTER
----- Message from Liv Dempsey MD sent at 7/20/2022  6:41 PM CDT -----  Outside labs from July reviewed and liver tests remain normal.

## 2022-07-25 NOTE — TELEPHONE ENCOUNTER
Portal message has been sent to patient in regards to test results.     ILDA LoN, RN  Nurse Navigator, Hepatology  [facility]

## 2022-11-21 ENCOUNTER — OFFICE VISIT (OUTPATIENT)
Dept: NEUROLOGY | Facility: CLINIC | Age: 57
End: 2022-11-21
Payer: COMMERCIAL

## 2022-11-21 VITALS
SYSTOLIC BLOOD PRESSURE: 131 MMHG | HEIGHT: 64 IN | DIASTOLIC BLOOD PRESSURE: 80 MMHG | BODY MASS INDEX: 28.49 KG/M2 | HEART RATE: 76 BPM

## 2022-11-21 DIAGNOSIS — G35 MS (MULTIPLE SCLEROSIS): Primary | ICD-10-CM

## 2022-11-21 DIAGNOSIS — R41.89 COGNITIVE CHANGES: ICD-10-CM

## 2022-11-21 PROCEDURE — 1159F MED LIST DOCD IN RCRD: CPT | Mod: CPTII,S$GLB,, | Performed by: STUDENT IN AN ORGANIZED HEALTH CARE EDUCATION/TRAINING PROGRAM

## 2022-11-21 PROCEDURE — 99205 OFFICE O/P NEW HI 60 MIN: CPT | Mod: S$GLB,,, | Performed by: STUDENT IN AN ORGANIZED HEALTH CARE EDUCATION/TRAINING PROGRAM

## 2022-11-21 PROCEDURE — 3008F PR BODY MASS INDEX (BMI) DOCUMENTED: ICD-10-PCS | Mod: CPTII,S$GLB,, | Performed by: STUDENT IN AN ORGANIZED HEALTH CARE EDUCATION/TRAINING PROGRAM

## 2022-11-21 PROCEDURE — 99205 PR OFFICE/OUTPT VISIT, NEW, LEVL V, 60-74 MIN: ICD-10-PCS | Mod: S$GLB,,, | Performed by: STUDENT IN AN ORGANIZED HEALTH CARE EDUCATION/TRAINING PROGRAM

## 2022-11-21 PROCEDURE — 1160F PR REVIEW ALL MEDS BY PRESCRIBER/CLIN PHARMACIST DOCUMENTED: ICD-10-PCS | Mod: CPTII,S$GLB,, | Performed by: STUDENT IN AN ORGANIZED HEALTH CARE EDUCATION/TRAINING PROGRAM

## 2022-11-21 PROCEDURE — 3079F PR MOST RECENT DIASTOLIC BLOOD PRESSURE 80-89 MM HG: ICD-10-PCS | Mod: CPTII,S$GLB,, | Performed by: STUDENT IN AN ORGANIZED HEALTH CARE EDUCATION/TRAINING PROGRAM

## 2022-11-21 PROCEDURE — 99999 PR PBB SHADOW E&M-EST. PATIENT-LVL V: ICD-10-PCS | Mod: PBBFAC,,, | Performed by: STUDENT IN AN ORGANIZED HEALTH CARE EDUCATION/TRAINING PROGRAM

## 2022-11-21 PROCEDURE — 3008F BODY MASS INDEX DOCD: CPT | Mod: CPTII,S$GLB,, | Performed by: STUDENT IN AN ORGANIZED HEALTH CARE EDUCATION/TRAINING PROGRAM

## 2022-11-21 PROCEDURE — 3075F PR MOST RECENT SYSTOLIC BLOOD PRESS GE 130-139MM HG: ICD-10-PCS | Mod: CPTII,S$GLB,, | Performed by: STUDENT IN AN ORGANIZED HEALTH CARE EDUCATION/TRAINING PROGRAM

## 2022-11-21 PROCEDURE — 99999 PR PBB SHADOW E&M-EST. PATIENT-LVL V: CPT | Mod: PBBFAC,,, | Performed by: STUDENT IN AN ORGANIZED HEALTH CARE EDUCATION/TRAINING PROGRAM

## 2022-11-21 PROCEDURE — 3075F SYST BP GE 130 - 139MM HG: CPT | Mod: CPTII,S$GLB,, | Performed by: STUDENT IN AN ORGANIZED HEALTH CARE EDUCATION/TRAINING PROGRAM

## 2022-11-21 PROCEDURE — 3079F DIAST BP 80-89 MM HG: CPT | Mod: CPTII,S$GLB,, | Performed by: STUDENT IN AN ORGANIZED HEALTH CARE EDUCATION/TRAINING PROGRAM

## 2022-11-21 PROCEDURE — 1159F PR MEDICATION LIST DOCUMENTED IN MEDICAL RECORD: ICD-10-PCS | Mod: CPTII,S$GLB,, | Performed by: STUDENT IN AN ORGANIZED HEALTH CARE EDUCATION/TRAINING PROGRAM

## 2022-11-21 PROCEDURE — 1160F RVW MEDS BY RX/DR IN RCRD: CPT | Mod: CPTII,S$GLB,, | Performed by: STUDENT IN AN ORGANIZED HEALTH CARE EDUCATION/TRAINING PROGRAM

## 2022-11-21 RX ORDER — MODAFINIL 200 MG/1
200 TABLET ORAL DAILY
Qty: 90 TABLET | Refills: 3 | Status: SHIPPED | OUTPATIENT
Start: 2022-11-21 | End: 2023-06-05 | Stop reason: SDUPTHER

## 2022-11-21 NOTE — PROGRESS NOTES
[facility]  New Patient Visit      Disease Summary     Principle neurological diagnosis: MS    Date of symptom onset: 1984  Date of diagnosis: 1984  Disease type at diagnosis: RR  Disease type currently: RR  Previous therapy: Intermittent steroids, Betaseron (flu-like reaction)  Current therapy: Cellcept 2014 - present (for autoimmune hepatitis)  Last MRI Brain: 4/2021  Last MRI C-spine: 4/2021  Last MRI T-spine: 4/2021  CSF: 1984 (diagnostic clinic at [place], reportedly consistent with MS)  JCV status: NA  Other relevant labs and tests: NA    History:     This is a patient with a history of autoimmune hepatitis, follows with Dr. Dempsey in hepatology.  She is on CellCept.    She carries a history of multiple sclerosis diagnosed 1984 (speech slurred),  diagnosed with MS after LP and MRI. She used to follow up with Dr. Thomas and then Dr. Peoples. Now establishing care at our center.   She has a relapsing remitting course since diagnosis, she recalls always able to recover after relapses with steroids. She has not had a relapse for awhile, last was approximately 5 years ago.     She was started on Betaseron in 2000s but couldn't tolerate it due to flu-like reaction and hair loss and therefore stopped it after a few years.  She has not been on any MS DMTs since then.  She is on Cellcept for autoimmune hepatitis since 2014    Last scan report that I can find in the EMR is from June 29, 2016, stating that she has a few hyperintense T2 lesions in the cerebral hemisphere white matter that are stable since 2014.  I am unable to review images from 4/24/21 which she brought today, will send to radiology for upload.     Neurologic symptoms, stable since last few years:  Heat sensitivity  She works as a  and it's hard for her to focus and she's fatigued all the time. She is on modafinil 100mg Qdaily. She used to be on Adderall. She finds it hard to process information sometimes.   Back and  "neck pain  Imbalance   Migraines - on Aimovig    She takes vitamin D daily 5000 IU    ROS:     A complete 9 system ROS was reviewed by me and otherwise negative .     Past Medical History:   Diagnosis Date    Anxiety     Autoimmune hepatitis     GERD (gastroesophageal reflux disease)     MS (multiple sclerosis)        Past Surgical History:   Procedure Laterality Date    COLONOSCOPY N/A 4/2/2018    Procedure: COLONOSCOPY;  Surgeon: Liv Dempsey MD;  Location: [facility];  Service: Endoscopy;  Laterality: N/A;       Family History   Problem Relation Age of Onset    Ovarian cancer Mother     Leukemia Sister     Breast cancer Sister     Multiple sclerosis Neg Hx        Social History     Socioeconomic History    Marital status: Single   Tobacco Use    Smoking status: Never    Smokeless tobacco: Never   Substance and Sexual Activity    Alcohol use: No    Drug use: No    Sexual activity: Never       Review of patient's allergies indicates:   Allergen Reactions    Aspirin     Bactrim [sulfamethoxazole-trimethoprim] Itching    Pneumococcal vaccine     Sulfa (sulfonamide antibiotics)        Living arrangements - the patient lives with their family.  Employment - works as       Exam:     Vitals:    11/21/22 1115   BP: 131/80   Pulse: 76   Height: 5' 4" (1.626 m)          In general, the patient is well nourished and appears to be in no acute distress.    MENTAL STATUS: language is fluent, normal verbal comprehension, some difficulty recalling details of her medical history, especially remote history, attention is normal, patient is alert and oriented x 3, fund of knowlege is appropriate by vocabulary.   Several mistakes on serial 7's however    CRANIAL NERVE EXAM:  There is no intrernuclear ophthalmoplegia.  Extraocular muscles are intact. Pupils are equal, round, and reactive to light. No facial asymmetry. Facial sensation is intact bilaterally. There is no dysarthria. Uvula is midline, and palate moves " symmetrically. Shoulder shrug intact bilaterlly. Tongue protrusion is midline. Hearing is intact to finger rub bilaterally. Neck is limited ROM due to pain.  Positive Lhermitte's    MOTOR EXAM: Normal bulk and tone throughout UE and LE bilaterally.   No pronator drift; rapid sequential movements are normal; Strength is  5/5 in all groups in the lower extremities and upper extremities   Except:  L hip flexors 4/5, R hip flexors 4/5  L knee extensors 4/5, R knee extensors 4/5  L ankle dorsiflexors 5/5, R ankle dorsiflexors 4/5      REFLEXES: 3+ and symmetric throughout in all four extremeties;positive Self's, no cross-adductors but she does have proximal extension b/l on patella testing    SENSORY EXAM: Normal to light touch, decreased PP in LUE LLE , intact vibration throughout.    COORDINATION: Normal finger-to-nose exam. HTS limited due to weakness    GAIT: Narrow based and stable. But unable to perform tandem gait.    Positive Romberg    Timed 25 Foot Walk: 11/21/2022   Did patient wear an AFO? No   Was assistive device used? No   Time for 25 Foot Walk (seconds) 4.68   Time for 25 Foot Walk (seconds) 4.55           Imaging (personally reviewed):     MRI Brain       Labs:     Lab Results   Component Value Date    [id number] 48 12/16/2016     No results found for: JCVINDEX, JCVANTIBODY  No results found for: RA2JYPKK, ABSOLUTECD3, EB0BKMIX, ABSOLUTECD8, AA3ZDHUN, ABSOLUTECD4, LABCD48  Lab Results   Component Value Date    WBC 4.68 12/16/2016    RBC 4.30 12/16/2016    HGB 11.2 (L) 12/16/2016    HCT 36.8 (L) 12/16/2016    MCV 86 12/16/2016    MCH 26.0 (L) 12/16/2016    MCHC 30.4 (L) 12/16/2016    RDW 13.9 12/16/2016     12/16/2016    MPV 11.8 12/16/2016    GRAN 2.0 12/16/2016    GRAN 43.4 12/16/2016    LYMPH 2.0 12/16/2016    LYMPH 42.9 12/16/2016    MONO 0.6 12/16/2016    MONO 12.4 12/16/2016    EOS 0.1 12/16/2016    BASO 0.01 12/16/2016    EOSINOPHIL 1.1 12/16/2016    BASOPHIL 0.2 12/16/2016     Sodium    Date Value Ref Range Status   12/16/2016 141 136 - 145 mmol/L Final     Potassium   Date Value Ref Range Status   12/16/2016 3.5 3.5 - 5.1 mmol/L Final     Chloride   Date Value Ref Range Status   12/16/2016 105 95 - 110 mmol/L Final     CO2   Date Value Ref Range Status   12/16/2016 27 23 - 29 mmol/L Final     Glucose   Date Value Ref Range Status   12/16/2016 84 70 - 110 mg/dL Final     BUN   Date Value Ref Range Status   12/16/2016 16 6 - 20 mg/dL Final     Creatinine   Date Value Ref Range Status   12/16/2016 1.0 0.5 - 1.4 mg/dL Final     Calcium   Date Value Ref Range Status   12/16/2016 9.5 8.7 - 10.5 mg/dL Final     Total Protein   Date Value Ref Range Status   12/16/2016 8.3 6.0 - 8.4 g/dL Final     Albumin   Date Value Ref Range Status   12/16/2016 4.1 3.5 - 5.2 g/dL Final     Total Bilirubin   Date Value Ref Range Status   12/16/2016 0.4 0.1 - 1.0 mg/dL Final     Comment:     For infants and newborns, interpretation of results should be based  on gestational age, weight and in agreement with clinical  observations.  Premature Infant recommended reference ranges:  Up to 24 hours.............<8.0 mg/dL  Up to 48 hours............<12.0 mg/dL  3-5 days..................<15.0 mg/dL  6-29 days.................<15.0 mg/dL       Alkaline Phosphatase   Date Value Ref Range Status   12/16/2016 96 55 - 135 U/L Final     AST   Date Value Ref Range Status   12/16/2016 14 10 - 40 U/L Final     ALT   Date Value Ref Range Status   12/16/2016 9 (L) 10 - 44 U/L Final     Anion Gap   Date Value Ref Range Status   12/16/2016 9 8 - 16 mmol/L Final     eGFR if    Date Value Ref Range Status   12/16/2016 >60.0 >60 mL/min/1.73 m^2 Final     eGFR if non    Date Value Ref Range Status   12/16/2016 >60.0 >60 mL/min/1.73 m^2 Final     Comment:     Calculation used to obtain the estimated glomerular filtration  rate (eGFR) is the CKD-EPI equation. Since race is unknown   in our information system, the  eGFR values for   -American and Non--American patients are given   for each creatinine result.                  Diagnosis/Assessment/Plan:     Multiple Sclerosis  -Assessment: RRMS stable off DMT, unclear lesion burden but likely has spinal cord lesions given UMN signs on exam and other focal findings. She is on Cellcept for autoimmune hepatitis which has limited evidence in MS. She may need another MS-specific DMT if evidence of disease progression. She has significant cognitive issues.  -Imaging: Will review her most recent MRI from 4/2022  -Disease Modifying Therapies: Decide after MRI review and make decision regarding resuming DMT, while keeping in mind of her hepatitis  Symptom management   -Fatigue: modafinil increase to 200mg Qd   -Neuropsych testing for baseline  Lifestyle modifications for brain health discussed.  Return to clinic in 4 weeks         Total time spent with the patient: 60 minutes, including face to face consultation, chart review and coordination of care, on the day of the visit. This includes face to face time and non-face to face time preparing to see the patient (eg, review of tests), obtaining and/or reviewing separately obtained history, documenting clinical information in the electronic or other health record, independently interpreting resultsand communicating results to the patient/family/caregiver, or care coordination.         Carolina Guerin MD, MSc  Attending neurologist

## 2022-11-21 NOTE — PATIENT INSTRUCTIONS
Neuropsychology testing for cognitive testing   Increase modafinil to 200mg daily   I will review the MRI and get back to you regarding MS medication       NeuroPsych   [phone]

## 2022-12-01 ENCOUNTER — PATIENT MESSAGE (OUTPATIENT)
Dept: PSYCHIATRY | Facility: CLINIC | Age: 57
End: 2022-12-01
Payer: COMMERCIAL

## 2022-12-05 DIAGNOSIS — K21.9 GASTROESOPHAGEAL REFLUX DISEASE: ICD-10-CM

## 2022-12-05 RX ORDER — PANTOPRAZOLE SODIUM 40 MG/1
TABLET, DELAYED RELEASE ORAL
Qty: 90 TABLET | Refills: 1 | Status: SHIPPED | OUTPATIENT
Start: 2022-12-05 | End: 2023-06-11 | Stop reason: SDUPTHER

## 2022-12-19 ENCOUNTER — DOCUMENTATION ONLY (OUTPATIENT)
Dept: INTERNAL MEDICINE | Facility: CLINIC | Age: 57
End: 2022-12-19
Payer: COMMERCIAL

## 2022-12-21 ENCOUNTER — OFFICE VISIT (OUTPATIENT)
Dept: NEUROLOGY | Facility: CLINIC | Age: 57
End: 2022-12-21
Payer: COMMERCIAL

## 2022-12-21 DIAGNOSIS — G35 MS (MULTIPLE SCLEROSIS): Primary | ICD-10-CM

## 2022-12-21 PROCEDURE — 1160F PR REVIEW ALL MEDS BY PRESCRIBER/CLIN PHARMACIST DOCUMENTED: ICD-10-PCS | Mod: CPTII,95,, | Performed by: STUDENT IN AN ORGANIZED HEALTH CARE EDUCATION/TRAINING PROGRAM

## 2022-12-21 PROCEDURE — 99214 OFFICE O/P EST MOD 30 MIN: CPT | Mod: 95,,, | Performed by: STUDENT IN AN ORGANIZED HEALTH CARE EDUCATION/TRAINING PROGRAM

## 2022-12-21 PROCEDURE — 1159F PR MEDICATION LIST DOCUMENTED IN MEDICAL RECORD: ICD-10-PCS | Mod: CPTII,95,, | Performed by: STUDENT IN AN ORGANIZED HEALTH CARE EDUCATION/TRAINING PROGRAM

## 2022-12-21 PROCEDURE — 99214 PR OFFICE/OUTPT VISIT, EST, LEVL IV, 30-39 MIN: ICD-10-PCS | Mod: 95,,, | Performed by: STUDENT IN AN ORGANIZED HEALTH CARE EDUCATION/TRAINING PROGRAM

## 2022-12-21 PROCEDURE — 1159F MED LIST DOCD IN RCRD: CPT | Mod: CPTII,95,, | Performed by: STUDENT IN AN ORGANIZED HEALTH CARE EDUCATION/TRAINING PROGRAM

## 2022-12-21 PROCEDURE — 1160F RVW MEDS BY RX/DR IN RCRD: CPT | Mod: CPTII,95,, | Performed by: STUDENT IN AN ORGANIZED HEALTH CARE EDUCATION/TRAINING PROGRAM

## 2022-12-21 NOTE — PROGRESS NOTES
[facility]  Follow Up Patient Visit Virtual    The patient location is: home  Visit type: Virtual visit with synchronous audio and video    Each patient to whom he or she provides medical services by telemedicine is:  (1) informed of the relationship between the physician and patient and the respective role of any other health care provider with respect to management of the patient; and (2) notified that he or she may decline to receive medical services by telemedicine and may withdraw from such care at any time.      Disease Summary     Principle neurological diagnosis: MS     Date of symptom onset: 1984  Date of diagnosis: 1984  Disease type at diagnosis: RR  Disease type currently: RR  Previous therapy: Intermittent steroids, Betaseron (flu-like reaction)  Current therapy: Cellcept 2014 - present (for autoimmune hepatitis)  Last MRI Brain: 4/2021  Last MRI C-spine: 4/2021  Last MRI T-spine: 4/2021  CSF: 1984 (diagnostic clinic at [place], reportedly consistent with MS)  JCV status: NA  Other relevant labs and tests: NA    Interval history:     She did not schedule neuropsych appointment yet because she wasn't sure if it will be helpful since her main symptoms was fatigue and tiredness affecting her ability to focus.     Neurologic symptoms, stable since last few years:  Heat sensitivity  She works as a  and it's hard for her to focus and she's fatigued all the time. Modafinil now increased to 200mg Qdaily. She used to be on Adderall. She finds it hard to process information sometimes.   Back and neck pain  Imbalance   Migraines - on Aimovig but she has not been able to self-inject consistently, she is now getting back on track. She's pending sinus procedure that may help with her headaches.       ROS:     SOCIAL HISTORY  Living arrangements - the patient lives with their family.  Social History     Socioeconomic History    Marital status: Single   Tobacco Use    Smoking  status: Never    Smokeless tobacco: Never   Substance and Sexual Activity    Alcohol use: No    Drug use: No    Sexual activity: Never       Employment - works as a       Exam:     Vitals unable to be obtained virtually         In general, the patient is well nourished and appears to be in no acute distress.          Imaging (personally reviewed):     MRI 4/8/22 brain and C-spine        Labs:     Lab Results   Component Value Date    [id number] 48 12/16/2016     No results found for: JCVINDEX, JCVANTIBODY  No results found for: GG0ZBEBU, ABSOLUTECD3, UN0CANPR, ABSOLUTECD8, YN0BXSOO, ABSOLUTECD4, LABCD48  Lab Results   Component Value Date    WBC 4.68 12/16/2016    RBC 4.30 12/16/2016    HGB 11.2 (L) 12/16/2016    HCT 36.8 (L) 12/16/2016    MCV 86 12/16/2016    MCH 26.0 (L) 12/16/2016    MCHC 30.4 (L) 12/16/2016    RDW 13.9 12/16/2016     12/16/2016    MPV 11.8 12/16/2016    GRAN 2.0 12/16/2016    GRAN 43.4 12/16/2016    LYMPH 2.0 12/16/2016    LYMPH 42.9 12/16/2016    MONO 0.6 12/16/2016    MONO 12.4 12/16/2016    EOS 0.1 12/16/2016    BASO 0.01 12/16/2016    EOSINOPHIL 1.1 12/16/2016    BASOPHIL 0.2 12/16/2016     Sodium   Date Value Ref Range Status   12/16/2016 141 136 - 145 mmol/L Final     Potassium   Date Value Ref Range Status   12/16/2016 3.5 3.5 - 5.1 mmol/L Final     Chloride   Date Value Ref Range Status   12/16/2016 105 95 - 110 mmol/L Final     CO2   Date Value Ref Range Status   12/16/2016 27 23 - 29 mmol/L Final     Glucose   Date Value Ref Range Status   12/16/2016 84 70 - 110 mg/dL Final     BUN   Date Value Ref Range Status   12/16/2016 16 6 - 20 mg/dL Final     Creatinine   Date Value Ref Range Status   12/16/2016 1.0 0.5 - 1.4 mg/dL Final     Calcium   Date Value Ref Range Status   12/16/2016 9.5 8.7 - 10.5 mg/dL Final     Total Protein   Date Value Ref Range Status   12/16/2016 8.3 6.0 - 8.4 g/dL Final     Albumin   Date Value Ref Range Status   12/16/2016 4.1 3.5 - 5.2  g/dL Final     Total Bilirubin   Date Value Ref Range Status   12/16/2016 0.4 0.1 - 1.0 mg/dL Final     Comment:     For infants and newborns, interpretation of results should be based  on gestational age, weight and in agreement with clinical  observations.  Premature Infant recommended reference ranges:  Up to 24 hours.............<8.0 mg/dL  Up to 48 hours............<12.0 mg/dL  3-5 days..................<15.0 mg/dL  6-29 days.................<15.0 mg/dL       Alkaline Phosphatase   Date Value Ref Range Status   12/16/2016 96 55 - 135 U/L Final     AST   Date Value Ref Range Status   12/16/2016 14 10 - 40 U/L Final     ALT   Date Value Ref Range Status   12/16/2016 9 (L) 10 - 44 U/L Final     Anion Gap   Date Value Ref Range Status   12/16/2016 9 8 - 16 mmol/L Final     eGFR if    Date Value Ref Range Status   12/16/2016 >60.0 >60 mL/min/1.73 m^2 Final     eGFR if non    Date Value Ref Range Status   12/16/2016 >60.0 >60 mL/min/1.73 m^2 Final     Comment:     Calculation used to obtain the estimated glomerular filtration  rate (eGFR) is the CKD-EPI equation. Since race is unknown   in our information system, the eGFR values for   -American and Non--American patients are given   for each creatinine result.                  Diagnosis/Assessment/Plan:     Multiple Sclerosis  -Assessment: Agree with diagnosis of RRMS. Last imaging 4/2022 reviewed - mild intraparenchymal lesion burden and 1 cervical spine lesion. Symptoms stable off of MS DMT. She is on Cellcept for autoimmune hepatitis which has limited evidence in MS.   -Imaging: Repeat MRI Brain, C and T spine in April 2023  -Disease Modifying Therapies: To switch to agent that may target both MS and autoimmune hepatitis, possibly rituximab, will discuss with Dr. Dempsey. Safety labs today.  Symptom management              -Fatigue: stable on Modafinil 200mg Qd              -Hold off on neuropsych testing for now that  fatigue is slightly better on increased modafinil  Lifestyle modifications for brain health discussed.  Return to clinic in 3 months               Total time spent with the patient: 30 minutes, including face to face consultation, chart review and coordination of care, on the day of the visit. This includes face to face time and non-face to face time preparing to see the patient (eg, review of tests), obtaining and/or reviewing separately obtained history, documenting clinical information in the electronic or other health record, independently interpreting resultsand communicating results to the patient/family/caregiver, or care coordination.           Carolina Guerin MD, MSc  Attending neurologist

## 2022-12-21 NOTE — PATIENT INSTRUCTIONS
MRI Brain and Spine April 2023  Labwork   I will discuss with Dr. Dempsey regarding switching you to an agent that can target both MS and autoimmune hepatitis.

## 2022-12-21 NOTE — Clinical Note
Follow up in April 2023 in clinic Please mail MRI and bloodwork orders to patient, with our return address/fax number for results
Hi Dr. Dempsey, I'm seeing this patient for multiple sclerosis and is thinking about starting her on treatment. She's on cellcept for her autoimmune hepatitis, and I was wondering if you guys use anything else, such as rituxmab, for treatment. I was thinking it may be better for her to be on one immune-modulating therapy rather than two from a safety standpoint. Happy to discuss further. Thanks! 
No

## 2022-12-27 ENCOUNTER — PATIENT MESSAGE (OUTPATIENT)
Dept: NEUROLOGY | Facility: CLINIC | Age: 57
End: 2022-12-27
Payer: COMMERCIAL

## 2022-12-27 ENCOUNTER — TELEPHONE (OUTPATIENT)
Dept: NEUROLOGY | Facility: CLINIC | Age: 57
End: 2022-12-27
Payer: COMMERCIAL

## 2022-12-27 NOTE — TELEPHONE ENCOUNTER
----- Message from Carolina Guerin MD sent at 12/21/2022 12:19 PM CST -----  Follow up in April 2023 in clinic  Please mail MRI and bloodwork orders to patient, with our return address/fax number for results

## 2023-01-03 ENCOUNTER — TELEPHONE (OUTPATIENT)
Dept: NEUROLOGY | Facility: CLINIC | Age: 58
End: 2023-01-03
Payer: COMMERCIAL

## 2023-01-03 NOTE — TELEPHONE ENCOUNTER
"I spoke with patient about message I received that the orders I mailed to her were not sealed patient stated they were closed off but not sealed I inform patient we put mail in the box and usually they sort and seal patient got upset and stated , " her information could of got stolen , I don't know what I'm doing" patient also stated that " call center was suppose to connect her to a supervisor not me ". I just wanted to document and inform I was still very polite and gave patient patient relations number.  "

## 2023-01-03 NOTE — TELEPHONE ENCOUNTER
----- Message from Joey Marin sent at 1/3/2023  9:26 AM CST -----  Contact: [phone]  Pt states her test results werent sealed -- and wants to speak with someone in ref to this matter -- please reach out to pt [phone]

## 2023-01-25 ENCOUNTER — TELEPHONE (OUTPATIENT)
Dept: NEUROLOGY | Facility: CLINIC | Age: 58
End: 2023-01-25

## 2023-01-25 DIAGNOSIS — G35 MULTIPLE SCLEROSIS: Primary | ICD-10-CM

## 2023-02-01 ENCOUNTER — TELEPHONE (OUTPATIENT)
Dept: NEUROLOGY | Facility: CLINIC | Age: 58
End: 2023-02-01

## 2023-02-01 NOTE — TELEPHONE ENCOUNTER
Labs reviewed from 1/26/23:  TB Gold negative   Strongyloides Ab negative   Normal CMP  Normal WBC and ALC  HIV negative   Hep A Ab negative  Hep B Surface Ag negative  Hep B Surface Ab negative   Hep B Core Ab negative   Hep C Ab negative  Normal immunoglobulins  VZV positive (past infection or vaccination)  RPR negative   JCV positive, 2.28 index  CP5=426    We can submit for auth for Rituxan.     Thanks for getting back to us.  While she is not having active relapsing MS, she is still at risk for disease progression. I'm in favor of treating her MS so I would like to trial rituximab while you slowly titrate down her Cellcept. Obviously low threshold to switch back if any worsening hepatic labs.    We can start the prior authorization process and let you know once everything is approved for infusion.  Thanks again!

## 2023-02-20 ENCOUNTER — PATIENT MESSAGE (OUTPATIENT)
Dept: PSYCHIATRY | Facility: CLINIC | Age: 58
End: 2023-02-20
Payer: COMMERCIAL

## 2023-02-23 ENCOUNTER — TELEPHONE (OUTPATIENT)
Dept: NEUROLOGY | Facility: CLINIC | Age: 58
End: 2023-02-23
Payer: COMMERCIAL

## 2023-02-24 NOTE — TELEPHONE ENCOUNTER
----- Message from Rhiannon Ennis sent at 2/23/2023 10:38 AM CST -----  Regarding: MRI Authorization  Contact: Maral @ [phone] option 2  Maral from [facility] is calling to see if office obtained authorization for MRI. Asking for a call back

## 2023-02-27 ENCOUNTER — TELEPHONE (OUTPATIENT)
Dept: NEUROLOGY | Facility: CLINIC | Age: 58
End: 2023-02-27
Payer: COMMERCIAL

## 2023-02-27 NOTE — TELEPHONE ENCOUNTER
----- Message from Mary Jo Palm sent at 2/27/2023 11:19 AM CST -----  Regarding: Patient advice  Contact: Karolina [phone] option 2  [facility] called to check the status of authorization for MRI for patient on patient behalf Please call  to discuss further

## 2023-02-28 ENCOUNTER — TELEPHONE (OUTPATIENT)
Dept: NEUROLOGY | Facility: CLINIC | Age: 58
End: 2023-02-28
Payer: COMMERCIAL

## 2023-02-28 NOTE — TELEPHONE ENCOUNTER
----- Message from Deborah Henry sent at 2/28/2023  2:26 PM CST -----  Contact: Claudia @ [phone]  Claudia from [employer] calling regarding Patient Advice (message) for # making sure the office recieve peer to peer request

## 2023-03-08 ENCOUNTER — TELEPHONE (OUTPATIENT)
Dept: NEUROLOGY | Facility: CLINIC | Age: 58
End: 2023-03-08
Payer: COMMERCIAL

## 2023-03-08 NOTE — TELEPHONE ENCOUNTER
----- Message from Jerry Hanson MA sent at 3/8/2023 10:47 AM CST -----   is calling to find out if Authorization was received for MRI spine and cervical without contrast.      Phone number [phone] opt 2   Fax [phone]

## 2023-03-22 ENCOUNTER — TELEPHONE (OUTPATIENT)
Dept: NEUROLOGY | Facility: CLINIC | Age: 58
End: 2023-03-22
Payer: COMMERCIAL

## 2023-03-22 NOTE — TELEPHONE ENCOUNTER
----- Message from Savannah Love sent at 3/22/2023  2:01 PM CDT -----  Regarding: Pt Advice  Contact: [phone]  Pt is calling about an MRI and she states she's claustrophobic and was told they don't have open MRI's at [facility] in [place], [place].  Pt needs the Dr to find her a location near [place] that has open MRI's or in [place].  Please call.

## 2023-03-24 ENCOUNTER — DOCUMENTATION ONLY (OUTPATIENT)
Dept: NEUROLOGY | Facility: CLINIC | Age: 58
End: 2023-03-24
Payer: COMMERCIAL

## 2023-03-24 ENCOUNTER — TELEPHONE (OUTPATIENT)
Dept: NEUROLOGY | Facility: CLINIC | Age: 58
End: 2023-03-24
Payer: COMMERCIAL

## 2023-03-24 NOTE — TELEPHONE ENCOUNTER
Spoke with patient and scheduled appt 4/21 sent to [facility] also sent message for  to send in RX for patient MRI's

## 2023-03-24 NOTE — TELEPHONE ENCOUNTER
----- Message from Tiff Ricketts RN sent at 3/20/2023 12:34 PM CDT -----  Contact: pt    ----- Message -----  From: Analisa Espinosa  Sent: 3/20/2023  12:31 PM CDT  To: [facility] Staff    Pt calling to get orders for an [facility]       Confirmed patient's contact info below:  Contact Name: Debbie Mackenzie  Phone Number: [phone]

## 2023-03-27 ENCOUNTER — DOCUMENTATION ONLY (OUTPATIENT)
Dept: NEUROLOGY | Facility: CLINIC | Age: 58
End: 2023-03-27

## 2023-03-27 NOTE — PROGRESS NOTES
Faxed Rituxan appeal to [facility], Medical Appeals, at [phone].

## 2023-03-31 ENCOUNTER — TELEPHONE (OUTPATIENT)
Dept: NEUROLOGY | Facility: CLINIC | Age: 58
End: 2023-03-31

## 2023-04-04 ENCOUNTER — HOSPITAL ENCOUNTER (OUTPATIENT)
Dept: RADIOLOGY | Facility: HOSPITAL | Age: 58
Discharge: HOME | End: 2023-04-04
Attending: STUDENT IN AN ORGANIZED HEALTH CARE EDUCATION/TRAINING PROGRAM
Payer: COMMERCIAL

## 2023-04-04 DIAGNOSIS — G35 MS (MULTIPLE SCLEROSIS): ICD-10-CM

## 2023-04-04 PROCEDURE — 72141 MRI NECK SPINE W/O DYE: CPT | Mod: TC

## 2023-04-04 PROCEDURE — 25500020 PHARM REV CODE 255: Performed by: STUDENT IN AN ORGANIZED HEALTH CARE EDUCATION/TRAINING PROGRAM

## 2023-04-04 PROCEDURE — 72156 MRI NECK SPINE W/O & W/DYE: CPT | Mod: TC

## 2023-04-04 PROCEDURE — A9577 INJ MULTIHANCE: HCPCS | Performed by: STUDENT IN AN ORGANIZED HEALTH CARE EDUCATION/TRAINING PROGRAM

## 2023-04-04 PROCEDURE — 70553 MRI BRAIN STEM W/O & W/DYE: CPT | Mod: TC

## 2023-04-04 PROCEDURE — 72157 MRI CHEST SPINE W/O & W/DYE: CPT | Mod: TC

## 2023-04-04 PROCEDURE — 72146 MRI CHEST SPINE W/O DYE: CPT | Mod: TC

## 2023-04-04 RX ADMIN — GADOBENATE DIMEGLUMINE 15 ML: 529 INJECTION, SOLUTION INTRAVENOUS at 01:04

## 2023-04-06 ENCOUNTER — PATIENT MESSAGE (OUTPATIENT)
Dept: HEPATOLOGY | Facility: CLINIC | Age: 58
End: 2023-04-06
Payer: COMMERCIAL

## 2023-04-06 ENCOUNTER — PATIENT MESSAGE (OUTPATIENT)
Dept: NEUROLOGY | Facility: CLINIC | Age: 58
End: 2023-04-06
Payer: COMMERCIAL

## 2023-04-07 ENCOUNTER — TELEPHONE (OUTPATIENT)
Dept: HEPATOLOGY | Facility: HOSPITAL | Age: 58
End: 2023-04-07
Payer: COMMERCIAL

## 2023-04-07 NOTE — TELEPHONE ENCOUNTER
Spoke with patient to address some of her concerns explained that Rituxan is not traditional chemo as she is thinking about it. I did advise her to have a more in depth convo with the prescribing doctor about risks and benefits of the treatment.

## 2023-05-31 ENCOUNTER — TELEPHONE (OUTPATIENT)
Dept: HEPATOLOGY | Facility: CLINIC | Age: 58
End: 2023-05-31
Payer: COMMERCIAL

## 2023-05-31 NOTE — TELEPHONE ENCOUNTER
----- Message from Juan Antonio Ennis sent at 5/31/2023  8:31 AM CDT -----  Contact: self  ..Type:  Sooner Apoointment Request    Caller is requesting a sooner appointment.  Caller declined first available appointment listed below.  Caller will not accept being placed on the waitlist and is requesting a message be sent to doctor.  Name of Caller: Teresita Mackenzie  When is the first available appointment? September   Symptoms: yearly follow up   Would the patient rather a call back or a response via [facility]?  Call back   Best Call Back Number: .[phone] (home)   Additional Information: pt states she though she was scheduled for July since last visit was on July 13

## 2023-06-06 RX ORDER — MODAFINIL 200 MG/1
200 TABLET ORAL DAILY
Qty: 90 TABLET | Refills: 1 | Status: SHIPPED | OUTPATIENT
Start: 2023-06-06 | End: 2023-12-28 | Stop reason: SDUPTHER

## 2023-06-11 DIAGNOSIS — K21.9 GASTROESOPHAGEAL REFLUX DISEASE: ICD-10-CM

## 2023-06-12 RX ORDER — PANTOPRAZOLE SODIUM 40 MG/1
40 TABLET, DELAYED RELEASE ORAL DAILY
Qty: 90 TABLET | Refills: 1 | Status: SHIPPED | OUTPATIENT
Start: 2023-06-12 | End: 2023-12-03 | Stop reason: SDUPTHER

## 2023-06-13 ENCOUNTER — PATIENT MESSAGE (OUTPATIENT)
Dept: NEUROLOGY | Facility: CLINIC | Age: 58
End: 2023-06-13
Payer: COMMERCIAL

## 2023-06-13 ENCOUNTER — PATIENT MESSAGE (OUTPATIENT)
Dept: HEPATOLOGY | Facility: CLINIC | Age: 58
End: 2023-06-13
Payer: COMMERCIAL

## 2023-06-14 ENCOUNTER — PATIENT MESSAGE (OUTPATIENT)
Dept: NEUROLOGY | Facility: CLINIC | Age: 58
End: 2023-06-14

## 2023-06-14 ENCOUNTER — OFFICE VISIT (OUTPATIENT)
Dept: NEUROLOGY | Facility: CLINIC | Age: 58
End: 2023-06-14
Payer: COMMERCIAL

## 2023-06-14 ENCOUNTER — TELEPHONE (OUTPATIENT)
Dept: NEUROLOGY | Facility: CLINIC | Age: 58
End: 2023-06-14

## 2023-06-14 DIAGNOSIS — E55.9 VITAMIN D DEFICIENCY: Primary | ICD-10-CM

## 2023-06-14 DIAGNOSIS — G35 MS (MULTIPLE SCLEROSIS): ICD-10-CM

## 2023-06-14 PROCEDURE — 1159F MED LIST DOCD IN RCRD: CPT | Mod: CPTII,95,, | Performed by: STUDENT IN AN ORGANIZED HEALTH CARE EDUCATION/TRAINING PROGRAM

## 2023-06-14 PROCEDURE — 1160F RVW MEDS BY RX/DR IN RCRD: CPT | Mod: CPTII,95,, | Performed by: STUDENT IN AN ORGANIZED HEALTH CARE EDUCATION/TRAINING PROGRAM

## 2023-06-14 PROCEDURE — 99215 OFFICE O/P EST HI 40 MIN: CPT | Mod: 95,,, | Performed by: STUDENT IN AN ORGANIZED HEALTH CARE EDUCATION/TRAINING PROGRAM

## 2023-06-14 PROCEDURE — 1160F PR REVIEW ALL MEDS BY PRESCRIBER/CLIN PHARMACIST DOCUMENTED: ICD-10-PCS | Mod: CPTII,95,, | Performed by: STUDENT IN AN ORGANIZED HEALTH CARE EDUCATION/TRAINING PROGRAM

## 2023-06-14 PROCEDURE — 1159F PR MEDICATION LIST DOCUMENTED IN MEDICAL RECORD: ICD-10-PCS | Mod: CPTII,95,, | Performed by: STUDENT IN AN ORGANIZED HEALTH CARE EDUCATION/TRAINING PROGRAM

## 2023-06-14 PROCEDURE — 99215 PR OFFICE/OUTPT VISIT, EST, LEVL V, 40-54 MIN: ICD-10-PCS | Mod: 95,,, | Performed by: STUDENT IN AN ORGANIZED HEALTH CARE EDUCATION/TRAINING PROGRAM

## 2023-06-14 NOTE — PROGRESS NOTES
[facility]  Follow Up Patient Visit Virtual    The patient location is: home  Visit type: Virtual visit with synchronous audio and video    Each patient to whom he or she provides medical services by telemedicine is:  (1) informed of the relationship between the physician and patient and the respective role of any other health care provider with respect to management of the patient; and (2) notified that he or she may decline to receive medical services by telemedicine and may withdraw from such care at any time.      Disease Summary     Principle neurological diagnosis: MS     Date of symptom onset: 1984  Date of diagnosis: 1984  Disease type at diagnosis: RR  Disease type currently: RR  Previous therapy: Intermittent steroids, Betaseron (flu-like reaction)  Current therapy: Cellcept 2014 - present (for autoimmune hepatitis), considering switch to rituxan monotherapy  Last MRI Brain: 4/4/23  Last MRI C-spine: 4/4/23  Last MRI T-spine: 4/4/23  CSF: 1984 (diagnostic clinic at [place], reportedly consistent with MS)  JCV status: NA  Other relevant labs and tests: Vitamin D 48 2016    Interval history:     She is concerned about rituximab being categorized as chemotherapy and concerned about the side effects     Neurologic symptoms stable, as discussed at least appointment:     She works as a  and it's hard for her to focus and she's fatigued all the time. She still feels tired at the end of the day. She is currently on modafinil 200mg Qd. She used to be on Adderall.  Migraines - on Aimovig, she hasn't been adherent so has seen increase in migraines     She takes vitamin D daily 5000 IU    ROS:     SOCIAL HISTORY  Living arrangements - the patient lives with their family.  Social History     Socioeconomic History    Marital status: Single   Tobacco Use    Smoking status: Never    Smokeless tobacco: Never   Substance and Sexual Activity    Alcohol use: No    Drug use: No     Sexual activity: Never       Employment: works as teacher      Exam:     Vitals unable to be obtained virtually         In general, the patient is well nourished and appears to be in no acute distress.            Imaging (personally reviewed):       MRI brain 4/2023  Stable exam compared to 04/08/2022.  No evidence of active demyelination.         MRI C-spine 4/2023  1. No cord signal abnormality or evidence of active demyelination.  2. No significant spinal canal or neural foraminal stenosis.         MRI T-spine 4/2023  1. No definite cord signal changes or evidence of active demyelination.  2. Moderate to severe canal stenosis at T7-T8, moderate at T8-T9, mild at T6-T7 with central disc protrusions.      Labs:     Lab Results   Component Value Date    FWZFMMSI16DL 48 12/16/2016     No results found for: JCVINDEX, JCVANTIBODY  No results found for: EZ7KSRSC, ABSOLUTECD3, AJ2KAEPS, ABSOLUTECD8, QG0SUWSP, ABSOLUTECD4, LABCD48  Lab Results   Component Value Date    WBC 4.68 12/16/2016    RBC 4.30 12/16/2016    HGB 11.2 (L) 12/16/2016    HCT 36.8 (L) 12/16/2016    MCV 86 12/16/2016    MCH 26.0 (L) 12/16/2016    MCHC 30.4 (L) 12/16/2016    RDW 13.9 12/16/2016     12/16/2016    MPV 11.8 12/16/2016    GRAN 2.0 12/16/2016    GRAN 43.4 12/16/2016    LYMPH 2.0 12/16/2016    LYMPH 42.9 12/16/2016    MONO 0.6 12/16/2016    MONO 12.4 12/16/2016    EOS 0.1 12/16/2016    BASO 0.01 12/16/2016    EOSINOPHIL 1.1 12/16/2016    BASOPHIL 0.2 12/16/2016     Sodium   Date Value Ref Range Status   12/16/2016 141 136 - 145 mmol/L Final     Potassium   Date Value Ref Range Status   12/16/2016 3.5 3.5 - 5.1 mmol/L Final     Chloride   Date Value Ref Range Status   12/16/2016 105 95 - 110 mmol/L Final     CO2   Date Value Ref Range Status   12/16/2016 27 23 - 29 mmol/L Final     Glucose   Date Value Ref Range Status   12/16/2016 84 70 - 110 mg/dL Final     BUN   Date Value Ref Range Status   12/16/2016 16 6 - 20 mg/dL Final      Creatinine   Date Value Ref Range Status   12/16/2016 1.0 0.5 - 1.4 mg/dL Final     Calcium   Date Value Ref Range Status   12/16/2016 9.5 8.7 - 10.5 mg/dL Final     Total Protein   Date Value Ref Range Status   12/16/2016 8.3 6.0 - 8.4 g/dL Final     Albumin   Date Value Ref Range Status   12/16/2016 4.1 3.5 - 5.2 g/dL Final     Total Bilirubin   Date Value Ref Range Status   12/16/2016 0.4 0.1 - 1.0 mg/dL Final     Comment:     For infants and newborns, interpretation of results should be based  on gestational age, weight and in agreement with clinical  observations.  Premature Infant recommended reference ranges:  Up to 24 hours.............<8.0 mg/dL  Up to 48 hours............<12.0 mg/dL  3-5 days..................<15.0 mg/dL  6-29 days.................<15.0 mg/dL       Alkaline Phosphatase   Date Value Ref Range Status   12/16/2016 96 55 - 135 U/L Final     AST   Date Value Ref Range Status   12/16/2016 14 10 - 40 U/L Final     ALT   Date Value Ref Range Status   12/16/2016 9 (L) 10 - 44 U/L Final     Anion Gap   Date Value Ref Range Status   12/16/2016 9 8 - 16 mmol/L Final     eGFR if    Date Value Ref Range Status   12/16/2016 >60.0 >60 mL/min/1.73 m^2 Final     eGFR if non    Date Value Ref Range Status   12/16/2016 >60.0 >60 mL/min/1.73 m^2 Final     Comment:     Calculation used to obtain the estimated glomerular filtration  rate (eGFR) is the CKD-EPI equation. Since race is unknown   in our information system, the eGFR values for   -American and Non--American patients are given   for each creatinine result.                  Diagnosis/Assessment/Plan:     Multiple Sclerosis  -Assessment: Agree with diagnosis of RRMS. Imaging 4/2023 reviewed - mild intraparenchymal lesion burden and cervical spine lesion not as clear. Symptoms stable off of MS DMT. She is on Cellcept for autoimmune hepatitis which has limited evidence in MS.   -Imaging: Repeat MRI Brain, C  in Oct 2023  -Disease Modifying Therapies: Discussed switching to agent that may target both MS and autoimmune hepatitis - rituximab. Okay with her hepatologist Dr. Dempsey. However, given her mild MS disease burden and patient is risk adverse, will continue to monitor, repeat imaging in Oct 2023, and low threshold to switch to rituximab if any new interval lesions and/or new symptoms/exam changes.   Symptom management              -Fatigue: stable on Modafinil 200mg Qd, okay to continue              -Check vitamin D and neurofilament light chain  Return to clinic after MRI         NEURO MULTIPLE SCLEROSIS IMPRESSION:   MS Status:     Number of relapses in the past year?:  0    Clinical Progression:  Clinically Stable    MRI Progression:  Stable  Plan:     DMT:  No change in management    Symptom Management:  No change in symptom management        Total time spent with the patient: 45 minutes, including face to face consultation, chart review and coordination of care, on the day of the visit. This includes face to face time and non-face to face time preparing to see the patient (eg, review of tests), obtaining and/or reviewing separately obtained history, documenting clinical information in the electronic or other health record, independently interpreting resultsand communicating results to the patient/family/caregiver, or care coordination.           Carolina Guerin MD, MSc  Attending neurologist

## 2023-07-21 ENCOUNTER — PATIENT MESSAGE (OUTPATIENT)
Dept: PSYCHIATRY | Facility: CLINIC | Age: 58
End: 2023-07-21
Payer: COMMERCIAL

## 2023-07-24 ENCOUNTER — OFFICE VISIT (OUTPATIENT)
Dept: HEPATOLOGY | Facility: CLINIC | Age: 58
End: 2023-07-24
Payer: COMMERCIAL

## 2023-07-24 VITALS
HEART RATE: 72 BPM | BODY MASS INDEX: 27.55 KG/M2 | HEIGHT: 64 IN | DIASTOLIC BLOOD PRESSURE: 86 MMHG | WEIGHT: 161.38 LBS | SYSTOLIC BLOOD PRESSURE: 134 MMHG

## 2023-07-24 DIAGNOSIS — K75.4 AUTOIMMUNE HEPATITIS: Primary | ICD-10-CM

## 2023-07-24 PROCEDURE — 99213 PR OFFICE/OUTPT VISIT, EST, LEVL III, 20-29 MIN: ICD-10-PCS | Mod: S$GLB,,, | Performed by: INTERNAL MEDICINE

## 2023-07-24 PROCEDURE — 99999 PR PBB SHADOW E&M-EST. PATIENT-LVL V: ICD-10-PCS | Mod: PBBFAC,,, | Performed by: INTERNAL MEDICINE

## 2023-07-24 PROCEDURE — 99213 OFFICE O/P EST LOW 20 MIN: CPT | Mod: S$GLB,,, | Performed by: INTERNAL MEDICINE

## 2023-07-24 PROCEDURE — 1159F MED LIST DOCD IN RCRD: CPT | Mod: CPTII,S$GLB,, | Performed by: INTERNAL MEDICINE

## 2023-07-24 PROCEDURE — 1159F PR MEDICATION LIST DOCUMENTED IN MEDICAL RECORD: ICD-10-PCS | Mod: CPTII,S$GLB,, | Performed by: INTERNAL MEDICINE

## 2023-07-24 PROCEDURE — 3008F PR BODY MASS INDEX (BMI) DOCUMENTED: ICD-10-PCS | Mod: CPTII,S$GLB,, | Performed by: INTERNAL MEDICINE

## 2023-07-24 PROCEDURE — 3079F PR MOST RECENT DIASTOLIC BLOOD PRESSURE 80-89 MM HG: ICD-10-PCS | Mod: CPTII,S$GLB,, | Performed by: INTERNAL MEDICINE

## 2023-07-24 PROCEDURE — 99999 PR PBB SHADOW E&M-EST. PATIENT-LVL V: CPT | Mod: PBBFAC,,, | Performed by: INTERNAL MEDICINE

## 2023-07-24 PROCEDURE — 3075F PR MOST RECENT SYSTOLIC BLOOD PRESS GE 130-139MM HG: ICD-10-PCS | Mod: CPTII,S$GLB,, | Performed by: INTERNAL MEDICINE

## 2023-07-24 PROCEDURE — 1160F RVW MEDS BY RX/DR IN RCRD: CPT | Mod: CPTII,S$GLB,, | Performed by: INTERNAL MEDICINE

## 2023-07-24 PROCEDURE — 3075F SYST BP GE 130 - 139MM HG: CPT | Mod: CPTII,S$GLB,, | Performed by: INTERNAL MEDICINE

## 2023-07-24 PROCEDURE — 3008F BODY MASS INDEX DOCD: CPT | Mod: CPTII,S$GLB,, | Performed by: INTERNAL MEDICINE

## 2023-07-24 PROCEDURE — 1160F PR REVIEW ALL MEDS BY PRESCRIBER/CLIN PHARMACIST DOCUMENTED: ICD-10-PCS | Mod: CPTII,S$GLB,, | Performed by: INTERNAL MEDICINE

## 2023-07-24 PROCEDURE — 3079F DIAST BP 80-89 MM HG: CPT | Mod: CPTII,S$GLB,, | Performed by: INTERNAL MEDICINE

## 2023-07-24 RX ORDER — MODAFINIL 200 MG/1
200 TABLET ORAL DAILY
COMMUNITY
End: 2023-12-28

## 2023-07-24 NOTE — PATIENT INSTRUCTIONS
You cannot have live vaccines because you are immunosuppressed, but you can have whatever dead vaccines you need.

## 2023-07-24 NOTE — PROGRESS NOTES
Subjective:     Debbie Mackenzie is here for follow up of autoimmune hepatitis      HPI  Since Debbie Mackenzie's last visit  there been no major issues.  For the most part she is been feeling well as relates to her liver.  She reports compliance with medications.  She does have issues related to her MS for which she is following up with a neurologist.    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  She is concerned about having to be outside in his he would as she reports that it flares up her MS.    No evidence of liver decompensation: no ascites, confusion or GI bleeding.    She has question about whether not she can take the shingles vaccine.    Outside labs from last week reviewed.  Liver tests are normal.  Hemoglobin about her baseline at 11.    Review of Systems    Objective:     Physical Exam  Vitals reviewed.   Constitutional:       General: She is not in acute distress.     Appearance: She is well-developed.   HENT:      Head: Normocephalic and atraumatic.      Mouth/Throat:      Pharynx: No oropharyngeal exudate.   Eyes:      General: No scleral icterus.        Right eye: No discharge.         Left eye: No discharge.      Conjunctiva/sclera: Conjunctivae normal.      Pupils: Pupils are equal, round, and reactive to light.   Pulmonary:      Effort: Pulmonary effort is normal. No respiratory distress.      Breath sounds: Normal breath sounds. No wheezing.   Abdominal:      General: There is no distension.      Palpations: Abdomen is soft.      Tenderness: There is no abdominal tenderness.   Neurological:      Mental Status: She is alert and oriented to person, place, and time.   Psychiatric:         Behavior: Behavior normal.       Computed MELD 3.0 unavailable. Necessary lab results were not found in the last year.  Computed MELD-Na unavailable. Necessary lab results were not found in the last year.      WBC   Date Value Ref Range Status   12/16/2016 4.68 3.90 - 12.70 K/uL Final      Hemoglobin   Date Value Ref Range Status   12/16/2016 11.2 (L) 12.0 - 16.0 g/dL Final     Hematocrit   Date Value Ref Range Status   12/16/2016 36.8 (L) 37.0 - 48.5 % Final     Platelets   Date Value Ref Range Status   12/16/2016 269 150 - 350 K/uL Final     BUN   Date Value Ref Range Status   12/16/2016 16 6 - 20 mg/dL Final     Creatinine   Date Value Ref Range Status   12/16/2016 1.0 0.5 - 1.4 mg/dL Final     Glucose   Date Value Ref Range Status   12/16/2016 84 70 - 110 mg/dL Final     Calcium   Date Value Ref Range Status   12/16/2016 9.5 8.7 - 10.5 mg/dL Final     Sodium   Date Value Ref Range Status   12/16/2016 141 136 - 145 mmol/L Final     Potassium   Date Value Ref Range Status   12/16/2016 3.5 3.5 - 5.1 mmol/L Final     Chloride   Date Value Ref Range Status   12/16/2016 105 95 - 110 mmol/L Final     AST   Date Value Ref Range Status   12/16/2016 14 10 - 40 U/L Final     ALT   Date Value Ref Range Status   12/16/2016 9 (L) 10 - 44 U/L Final     Alkaline Phosphatase   Date Value Ref Range Status   12/16/2016 96 55 - 135 U/L Final     Total Bilirubin   Date Value Ref Range Status   12/16/2016 0.4 0.1 - 1.0 mg/dL Final     Comment:     For infants and newborns, interpretation of results should be based  on gestational age, weight and in agreement with clinical  observations.  Premature Infant recommended reference ranges:  Up to 24 hours.............<8.0 mg/dL  Up to 48 hours............<12.0 mg/dL  3-5 days..................<15.0 mg/dL  6-29 days.................<15.0 mg/dL       Albumin   Date Value Ref Range Status   12/16/2016 4.1 3.5 - 5.2 g/dL Final     No results found for: INR      Assessment/Plan:     1. Autoimmune hepatitis      Debbie Mackenzie is a 58 y.o. female withautoimmune hepatitis    Autoimmune hepatitis-  Disease controlled on treatment  - continue on CellCept  -  Advised patient that she should not take live vaccines but that she can have any dead vaccine  - continue with labs every  6 months and yearly visit  -     Comprehensive Metabolic Panel; Standing  -     CBC Auto Differential; Standing          Liv Moore MD

## 2023-07-25 ENCOUNTER — PATIENT MESSAGE (OUTPATIENT)
Dept: NEUROLOGY | Facility: CLINIC | Age: 58
End: 2023-07-25
Payer: COMMERCIAL

## 2023-07-25 NOTE — LETTER
[facility] [address]  [address]  [facility] [address]  Phone: [phone]         2023      RE: Debbie Mackenzie (: 1965)      Dear Employer:    Ms. Mackenzie is under my care for treatment of a chronic neurological condition that causes a variety of symptoms including fatigue, pain, and cognitive fog.  Additionally, like many people with her condition, she experiences heat sensitivity, which means her other symptoms worsen when she is exposed to heat.  I am requesting two reasonable accommodations under the Americans with Disabilities Act (ADA) that would allow her to perform her job duties and avoid exacerbation of her symptoms.    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  Next, I request she be excused from outdoor activities during the months of May - October.  This will help her avoid heat sensitivity during the warmer months, so she can perform her job duties without increased fatigue, pain, or cognitive fog.    I am available to address any questions or concerns at the above phone number, and I am happy to complete an paperwork required for this ADA reasonable accommodation request.    Sincerely,    Carolina Guerin MD      JF:[email or web address]

## 2023-07-25 NOTE — LETTER
[facility] [address]  [address]  [facility] [address]  Phone: [phone]         2023      RE: Debbie Mackenzie (: 1965)      Dear Employer:    Ms. Mackenzie is under my care for treatment of a chronic neurological condition that causes a variety of symptoms including fatigue, pain, and cognitive fog.  Additionally, like many people with her condition, she experiences heat sensitivity, which means her other symptoms worsen when she is exposed to heat.  I am requesting two reasonable accommodations under the Americans with Disabilities Act (ADA) that would allow her to perform her job duties and avoid exacerbation of her symptoms.    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  Next, I request she be excused from outdoor activities during the months of May - October.  This will help her avoid heat sensitivity during the warmer months, so she can perform her job duties without increased fatigue, pain, or cognitive fog.    I am available to address any questions or concerns at the above phone number, and I am happy to complete an paperwork required for this ADA reasonable accommodation request.    Sincerely,    Carolina Guerin MD      JF:[email or web address]

## 2023-07-28 ENCOUNTER — TELEPHONE (OUTPATIENT)
Dept: PSYCHIATRY | Facility: CLINIC | Age: 58
End: 2023-07-28
Payer: COMMERCIAL

## 2023-07-28 NOTE — TELEPHONE ENCOUNTER
Spoke with pt and advised ADA letter would be ready soon.    ----- Message from Kaitlynn Evans sent at 7/28/2023 11:28 AM CDT -----  Regarding: need clarification  Contact: [phone]  Debbie Simpson calling regarding Patient Advice (message) for accommodations at work.  She need clarification

## 2023-07-28 NOTE — TELEPHONE ENCOUNTER
SW received 2 call messages from pt.  Spoke with her and explained that her letter will be ready soon.

## 2023-10-09 ENCOUNTER — HOSPITAL ENCOUNTER (OUTPATIENT)
Dept: RADIOLOGY | Facility: HOSPITAL | Age: 58
Discharge: HOME | End: 2023-10-09
Attending: STUDENT IN AN ORGANIZED HEALTH CARE EDUCATION/TRAINING PROGRAM
Payer: COMMERCIAL

## 2023-10-09 DIAGNOSIS — G35 MS (MULTIPLE SCLEROSIS): ICD-10-CM

## 2023-10-09 PROCEDURE — 25500020 PHARM REV CODE 255: Performed by: STUDENT IN AN ORGANIZED HEALTH CARE EDUCATION/TRAINING PROGRAM

## 2023-10-09 PROCEDURE — 70553 MRI BRAIN STEM W/O & W/DYE: CPT | Mod: TC

## 2023-10-09 PROCEDURE — A9577 INJ MULTIHANCE: HCPCS | Performed by: STUDENT IN AN ORGANIZED HEALTH CARE EDUCATION/TRAINING PROGRAM

## 2023-10-09 PROCEDURE — 72156 MRI NECK SPINE W/O & W/DYE: CPT | Mod: TC

## 2023-10-09 RX ADMIN — GADOBENATE DIMEGLUMINE 15 ML: 529 INJECTION, SOLUTION INTRAVENOUS at 02:10

## 2023-10-11 ENCOUNTER — TELEPHONE (OUTPATIENT)
Dept: NEUROLOGY | Facility: CLINIC | Age: 58
End: 2023-10-11
Payer: COMMERCIAL

## 2023-11-30 ENCOUNTER — TELEPHONE (OUTPATIENT)
Dept: NEUROLOGY | Facility: CLINIC | Age: 58
End: 2023-11-30
Payer: COMMERCIAL

## 2023-11-30 NOTE — TELEPHONE ENCOUNTER
Spoke with pt in regards to her upcoming appt on 12/4; pt had to reschedule for a later time and date due to her being 3 hours away. Scheduled pt on 1/26/24 at 12pm with Dr. Guerin.

## 2023-12-03 DIAGNOSIS — K21.9 GASTROESOPHAGEAL REFLUX DISEASE: ICD-10-CM

## 2023-12-03 DIAGNOSIS — K75.4 AUTOIMMUNE HEPATITIS: ICD-10-CM

## 2023-12-04 RX ORDER — MYCOPHENOLATE MOFETIL 500 MG/1
500 TABLET ORAL 2 TIMES DAILY
Qty: 180 TABLET | Refills: 3 | Status: SHIPPED | OUTPATIENT
Start: 2023-12-04

## 2023-12-04 RX ORDER — PANTOPRAZOLE SODIUM 40 MG/1
40 TABLET, DELAYED RELEASE ORAL DAILY
Qty: 90 TABLET | Refills: 1 | Status: SHIPPED | OUTPATIENT
Start: 2023-12-04

## 2023-12-28 ENCOUNTER — OFFICE VISIT (OUTPATIENT)
Dept: NEUROLOGY | Facility: CLINIC | Age: 58
End: 2023-12-28
Payer: COMMERCIAL

## 2023-12-28 VITALS
HEIGHT: 64 IN | SYSTOLIC BLOOD PRESSURE: 136 MMHG | DIASTOLIC BLOOD PRESSURE: 86 MMHG | HEART RATE: 69 BPM | WEIGHT: 164.88 LBS | BODY MASS INDEX: 28.15 KG/M2

## 2023-12-28 DIAGNOSIS — G35 MS (MULTIPLE SCLEROSIS): ICD-10-CM

## 2023-12-28 DIAGNOSIS — N32.81 OAB (OVERACTIVE BLADDER): Primary | ICD-10-CM

## 2023-12-28 DIAGNOSIS — R53.82 CHRONIC FATIGUE: ICD-10-CM

## 2023-12-28 PROCEDURE — 99215 PR OFFICE/OUTPT VISIT, EST, LEVL V, 40-54 MIN: ICD-10-PCS | Mod: S$GLB,,,

## 2023-12-28 PROCEDURE — 3079F DIAST BP 80-89 MM HG: CPT | Mod: CPTII,S$GLB,,

## 2023-12-28 PROCEDURE — 1159F PR MEDICATION LIST DOCUMENTED IN MEDICAL RECORD: ICD-10-PCS | Mod: CPTII,S$GLB,,

## 2023-12-28 PROCEDURE — 3075F PR MOST RECENT SYSTOLIC BLOOD PRESS GE 130-139MM HG: ICD-10-PCS | Mod: CPTII,S$GLB,,

## 2023-12-28 PROCEDURE — 3075F SYST BP GE 130 - 139MM HG: CPT | Mod: CPTII,S$GLB,,

## 2023-12-28 PROCEDURE — 99999 PR PBB SHADOW E&M-EST. PATIENT-LVL IV: ICD-10-PCS | Mod: PBBFAC,,,

## 2023-12-28 PROCEDURE — 3008F PR BODY MASS INDEX (BMI) DOCUMENTED: ICD-10-PCS | Mod: CPTII,S$GLB,,

## 2023-12-28 PROCEDURE — 1159F MED LIST DOCD IN RCRD: CPT | Mod: CPTII,S$GLB,,

## 2023-12-28 PROCEDURE — 99999 PR PBB SHADOW E&M-EST. PATIENT-LVL IV: CPT | Mod: PBBFAC,,,

## 2023-12-28 PROCEDURE — 1160F PR REVIEW ALL MEDS BY PRESCRIBER/CLIN PHARMACIST DOCUMENTED: ICD-10-PCS | Mod: CPTII,S$GLB,,

## 2023-12-28 PROCEDURE — 1160F RVW MEDS BY RX/DR IN RCRD: CPT | Mod: CPTII,S$GLB,,

## 2023-12-28 PROCEDURE — 3008F BODY MASS INDEX DOCD: CPT | Mod: CPTII,S$GLB,,

## 2023-12-28 PROCEDURE — 99215 OFFICE O/P EST HI 40 MIN: CPT | Mod: S$GLB,,,

## 2023-12-28 PROCEDURE — 3079F PR MOST RECENT DIASTOLIC BLOOD PRESSURE 80-89 MM HG: ICD-10-PCS | Mod: CPTII,S$GLB,,

## 2023-12-28 RX ORDER — MYCOPHENOLATE MOFETIL 500 MG/1
500 TABLET ORAL 2 TIMES DAILY
COMMUNITY

## 2023-12-28 RX ORDER — MODAFINIL 200 MG/1
200 TABLET ORAL DAILY
Qty: 90 TABLET | Refills: 1 | Status: SHIPPED | OUTPATIENT
Start: 2023-12-28

## 2023-12-28 RX ORDER — OXYBUTYNIN CHLORIDE 10 MG/1
10 TABLET, EXTENDED RELEASE ORAL NIGHTLY
Qty: 30 TABLET | Refills: 5 | Status: SHIPPED | OUTPATIENT
Start: 2023-12-28 | End: 2024-12-27

## 2023-12-28 NOTE — PROGRESS NOTES
Patient ID: Debbie Mackenzie is a 58 y.o. female who presents today for a routine clinic visit for MS.  She was last seen by Dr. Guerin on 6/14/2023.  The history was provided by the patient.     Principle neurological diagnosis: MS  Date of symptom onset: 1984  Date of diagnosis: 1984  Disease type at diagnosis: RR  Disease type currently: RR  Previous therapy: Intermittent steroids, Betaseron (flu-like reaction)  Current therapy: Cellcept 2014 - present   Vitamin D Dose: [id number] weekly   Last MRI Brain: 10/9/2023 - stable   Last MRI C-spine: 10/9/2023 - stable   Last MRI T-spine: 4/4/23 - no lesions   CSF: 1984 (diagnostic clinic at [facility], reportedly consistent with MS)  JCV status: NA  Other relevant labs and tests: Vitamin D 48 2016    Subjective:     She states that she feels pretty stable with MS.  The only symptom that she struggles with is extreme fatigue.  She is on modafinil, but she is still tired.  She is also taking lexapro in the evening and also takes Provera (medroxyprogesterone) during the day.    She states that she does not sleep well due to having to urinate every 1.5 - 2 hours a night.          SOCIAL HISTORY  Living arrangements - the patient lives alone.  Social History     Socioeconomic History    Marital status: Single   Tobacco Use    Smoking status: Never    Smokeless tobacco: Never   Substance and Sexual Activity    Alcohol use: No    Drug use: No    Sexual activity: Never       Current Outpatient Medications on File Prior to Visit   Medication Sig Dispense Refill    ALBUTEROL INHL Inhale into the lungs.      alprazolam (XANAX) 0.5 MG tablet Take 0.5 mg by mouth once daily. Once daily.      azelastine (ASTELIN) 137 mcg (0.1 %) nasal spray by Nasal route.      BYSTOLIC 5 mg Tab Take 5 mg by mouth once daily.      calcium carbonate (OS-REGINA) 600 mg calcium (1,500 mg) Tab Take 600 mg by mouth.      ergocalciferol (ERGOCALCIFEROL) 50,000 unit Cap Take 50,000 Units by mouth once daily. Once  daily.      escitalopram oxalate (LEXAPRO) 10 MG tablet Take 30 mg by mouth once daily.       estradiol (ESTRACE) 0.5 MG tablet Take 0.5 mg by mouth.      Lactobacillus rhamnosus GG (CULTURELLE) 10 billion cell capsule Take 1 capsule by mouth once daily.      loratadine (CLARITIN) 10 mg tablet Take 10 mg by mouth.      medroxyPROGESTERone (PROVERA) 2.5 MG tablet Take 2.5 mg by mouth once daily.  12    metoprolol succinate (TOPROL-XL) 25 MG 24 hr tablet Take 25 mg by mouth once daily.      montelukast (SINGULAIR) 10 mg tablet Take 10 mg by mouth once daily.      mycophenolate (CELLCEPT) 500 mg Tab Take 1 tablet (500 mg total) by mouth 2 (two) times daily. 180 tablet 3    mycophenolate (CELLCEPT) 500 mg Tab 500 mg 2 (two) times a day.      pantoprazole (PROTONIX) 40 MG tablet Take 1 tablet (40 mg total) by mouth once daily. 90 tablet 1    tramadol (ULTRAM) 50 mg tablet Take 50 mg by mouth every 6 (six) hours as needed for Pain.      [DISCONTINUED] modafiniL (PROVIGIL) 200 MG Tab Take 1 tablet (200 mg total) by mouth once daily. 90 tablet 1    diazePAM (VALIUM) 5 MG tablet Take 1 tablet (5 mg total) by mouth as needed (Claustrophobia and anxiety in MRI). 2 tablet 0    [DISCONTINUED] biotin 5,000 mcg TbDL Take by mouth once daily.      [DISCONTINUED] modafiniL (PROVIGIL) 200 MG Tab Take 200 mg by mouth once daily.       No current facility-administered medications on file prior to visit.       Objective:     1. 25 foot timed walk:      11/21/2022    12:02 AM 12/28/2023    12:01 AM   Timed 25 Foot Walk:   Did patient wear an AFO? No No   Was assistive device used? No No   Time for 25 Foot Walk (seconds) 4.68 4.85   Time for 25 Foot Walk (seconds) 4.55 4.5           NEURO EXAM    In general, the patient is well nourished and appears to be in no acute distress.    MENTAL STATUS: language is fluent, normal verbal comprehension, short-term and remote memory is intact, attention is normal, patient is alert and oriented x 3,  fund of knowlege is appropriate by vocabulary.     CRANIAL NERVE EXAM:  There is no internuclear ophthalmoplegia.  Extraocular muscles are intact.  No facial asymmetry. Facial sensation is intact bilaterally. There is no dysarthria. Uvula is midline, and palate moves symmetrically. Shoulder shrug intact bilaterlly. Tongue protrusion is midline. Hearing is intact to finger rub bilaterally. Neck is supple with full ROM    MOTOR EXAM: Normal bulk and tone throughout UE and LE bilaterally.  Rapid sequential movements are normal; Strength is  5/5 in all groups in the lower extremities and upper extremities    REFLEXES: 3+ and symmetric throughout in all four extremities    SENSORY EXAM: Normal to light touch and vibration throughout     COORDINATION: Normal finger-to-nose exam. Normal heel-to-shin exam.    GAIT: wide based and stable. unable to heel walk, toe walk, or perform tandem gait.     Negative Romberg's slight sway     Imaging:     Personally reviewed and discussed with patient.    MRI Brain W/WO Contrast 10/9/2023:     Impression:     Periventricular and some subcortical white matter changes seen with a pattern and distribution consistent with patient's history of demyelinating process/multiple sclerosis.  No evidence of enhancing plaque is seen to suggest active demyelination.     Findings are unchanged since the prior examination        Electronically signed by: Teofilo Bradford  Date:                                            10/09/2023  Time:                                           16:18        MRI Cervical Spine W/WO Contrast 10/9/2023:      Impression:     No evidence of active or inactive demyelination seen     Left paracentral disc bulge at T1-T2 as outlined above overall unchanged since prior examination        Electronically signed by: Teofilo Bradford  Date:                                            10/09/2023  Time:                                           16:05         Labs:     Lab Results  "  Component Value Date    COBHCOZU83SB 48 12/16/2016     No results found for: "JCVINDEX", "JCVANTIBODY"  No results found for: "FK2XDLFZ", "ABSOLUTECD3", "NX9CUVRK", "ABSOLUTECD8", "GN7ZZEKA", "ABSOLUTECD4", "LABCD48"  Lab Results   Component Value Date    WBC 4.68 12/16/2016    RBC 4.30 12/16/2016    HGB 11.2 (L) 12/16/2016    HCT 36.8 (L) 12/16/2016    MCV 86 12/16/2016    MCH 26.0 (L) 12/16/2016    MCHC 30.4 (L) 12/16/2016    RDW 13.9 12/16/2016     12/16/2016    MPV 11.8 12/16/2016    GRAN 2.0 12/16/2016    GRAN 43.4 12/16/2016    LYMPH 2.0 12/16/2016    LYMPH 42.9 12/16/2016    MONO 0.6 12/16/2016    MONO 12.4 12/16/2016    EOS 0.1 12/16/2016    BASO 0.01 12/16/2016    EOSINOPHIL 1.1 12/16/2016    BASOPHIL 0.2 12/16/2016     Sodium   Date Value Ref Range Status   12/16/2016 141 136 - 145 mmol/L Final     Potassium   Date Value Ref Range Status   12/16/2016 3.5 3.5 - 5.1 mmol/L Final     Chloride   Date Value Ref Range Status   12/16/2016 105 95 - 110 mmol/L Final     CO2   Date Value Ref Range Status   12/16/2016 27 23 - 29 mmol/L Final     Glucose   Date Value Ref Range Status   12/16/2016 84 70 - 110 mg/dL Final     BUN   Date Value Ref Range Status   12/16/2016 16 6 - 20 mg/dL Final     Creatinine   Date Value Ref Range Status   12/16/2016 1.0 0.5 - 1.4 mg/dL Final     Calcium   Date Value Ref Range Status   12/16/2016 9.5 8.7 - 10.5 mg/dL Final     Total Protein   Date Value Ref Range Status   12/16/2016 8.3 6.0 - 8.4 g/dL Final     Albumin   Date Value Ref Range Status   12/16/2016 4.1 3.5 - 5.2 g/dL Final     Total Bilirubin   Date Value Ref Range Status   12/16/2016 0.4 0.1 - 1.0 mg/dL Final     Comment:     For infants and newborns, interpretation of results should be based  on gestational age, weight and in agreement with clinical  observations.  Premature Infant recommended reference ranges:  Up to 24 hours.............<8.0 mg/dL  Up to 48 hours............<12.0 mg/dL  3-5 " days..................<15.0 mg/dL  6-29 days.................<15.0 mg/dL       Alkaline Phosphatase   Date Value Ref Range Status   12/16/2016 96 55 - 135 U/L Final     AST   Date Value Ref Range Status   12/16/2016 14 10 - 40 U/L Final     ALT   Date Value Ref Range Status   12/16/2016 9 (L) 10 - 44 U/L Final     Anion Gap   Date Value Ref Range Status   12/16/2016 9 8 - 16 mmol/L Final     eGFR if    Date Value Ref Range Status   12/16/2016 >60.0 >60 mL/min/1.73 m^2 Final     eGFR if non    Date Value Ref Range Status   12/16/2016 >60.0 >60 mL/min/1.73 m^2 Final     Comment:     Calculation used to obtain the estimated glomerular filtration  rate (eGFR) is the CKD-EPI equation. Since race is unknown   in our information system, the eGFR values for   -American and Non--American patients are given   for each creatinine result.       Lab Results   Component Value Date    HEPBSAB Negative 12/16/2016           MS Impression and Plan:     NEURO MULTIPLE SCLEROSIS IMPRESSION:   MS Status:     Number of relapses in the past year?:  0    Clinical Progression:  Clinically Stable    MRI Progression:  Stable  Plan:     DMT:  No change in management    DMT comment:  Continue Cellcept for autoimmune hepatitis and off DMT for MS since continue to stay stable.  Continue vitamin D supplementations     Symptom Management:  Implement change in symptom management    Implement Change in Symptom Management:  Fatigue and Bladder (will start CoQ10 for fatigue, start oxybutynin for noturia)       Spoke to patient about considering to take Lexapro during the day to see if that makes a difference.  Also, let her know that progesterone supplements may cause drowsiness and to consult with her GYN if it should be taken at night instead of during the day.    MRI in 1 year   Follow up with Dr. Guerin in 6 months.     Plan discussed and questions were answered to satisfaction.     Problem List Items  Addressed This Visit          Neuro    MS (multiple sclerosis)    Relevant Medications    modafiniL (PROVIGIL) 200 MG Tab     Other Visit Diagnoses       OAB (overactive bladder)    -  Primary    Relevant Medications    oxybutynin (DITROPAN-XL) 10 MG 24 hr tablet    Chronic fatigue        Relevant Medications    modafiniL (PROVIGIL) 200 MG Tab            I spent a total of 60 minutes on the day of the visit.This includes face to face time and non-face to face time preparing to see the patient (eg, review of tests), obtaining and/or reviewing separately obtained history, documenting clinical information in the electronic or other health record, independently interpreting results and communicating results to the patient/family/caregiver, or care coordinator.       Tiffanie Fabian, SHAQ-C

## 2024-01-22 ENCOUNTER — PATIENT MESSAGE (OUTPATIENT)
Dept: PSYCHIATRY | Facility: CLINIC | Age: 59
End: 2024-01-22
Payer: COMMERCIAL

## 2024-03-26 ENCOUNTER — PATIENT MESSAGE (OUTPATIENT)
Dept: PSYCHIATRY | Facility: CLINIC | Age: 59
End: 2024-03-26
Payer: COMMERCIAL

## 2024-05-02 ENCOUNTER — PATIENT MESSAGE (OUTPATIENT)
Dept: PSYCHIATRY | Facility: CLINIC | Age: 59
End: 2024-05-02
Payer: COMMERCIAL

## 2024-05-06 NOTE — PROGRESS NOTES
Subjective:     Debbie Mackenzie is here for follow up of Hepatitis (Autoimmune Hepatitis) and Gastroesophageal Reflux (EGD/ Colonoscopy consult)      HPI  Since Debbie Mackenzie's last visit she overall has been feeling well.  She did have an issue where she reports some headache and some other neurologic symptoms that she thought secondary to multiple sclerosis.  She is not followed up with her in as doctor.  She did see her primary care doctor who started her on steroids per her request.  Her symptoms have improved.    he is chronic issues with reflux that are persistent even with PPI.  No associated weight loss, N/V.    She has never had colon cancer screening.  No family history of colon cancer.    No evidence of liver decompensation: no ascites, confusion or GI bleeding.      Review of Systems   Constitutional: Positive for fatigue.   HENT: Negative.    Eyes: Negative.    Respiratory: Negative.    Cardiovascular: Negative.    Gastrointestinal: Negative.    Genitourinary: Negative.    Musculoskeletal: Negative.    Skin: Negative.    Neurological: Positive for weakness, numbness and headaches.   Psychiatric/Behavioral: Negative.        Objective:     Physical Exam   Constitutional: She is oriented to person, place, and time. She appears well-developed and well-nourished. No distress.   HENT:   Head: Normocephalic and atraumatic.   Mouth/Throat: Oropharynx is clear and moist. No oropharyngeal exudate.   Eyes: Conjunctivae are normal. Pupils are equal, round, and reactive to light. Right eye exhibits no discharge. Left eye exhibits no discharge. No scleral icterus.   Pulmonary/Chest: Effort normal and breath sounds normal. No respiratory distress. She has no wheezes.   Abdominal: Soft. She exhibits no distension. There is no tenderness.   Musculoskeletal: She exhibits no edema.   Neurological: She is alert and oriented to person, place, and time.   Psychiatric: She has a normal mood and affect. Her behavior is  normal.   Vitals reviewed.      Computed MELD-Na score unavailable. Necessary lab results were not found in the last year.  Computed MELD score unavailable. Necessary lab results were not found in the last year.    WBC   Date Value Ref Range Status   12/16/2016 4.68 3.90 - 12.70 K/uL Final     Hemoglobin   Date Value Ref Range Status   12/16/2016 11.2 (L) 12.0 - 16.0 g/dL Final     Hematocrit   Date Value Ref Range Status   12/16/2016 36.8 (L) 37.0 - 48.5 % Final     Platelets   Date Value Ref Range Status   12/16/2016 269 150 - 350 K/uL Final     BUN, Bld   Date Value Ref Range Status   12/16/2016 16 6 - 20 mg/dL Final     Creatinine   Date Value Ref Range Status   12/16/2016 1.0 0.5 - 1.4 mg/dL Final     Glucose   Date Value Ref Range Status   12/16/2016 84 70 - 110 mg/dL Final     Calcium   Date Value Ref Range Status   12/16/2016 9.5 8.7 - 10.5 mg/dL Final     Sodium   Date Value Ref Range Status   12/16/2016 141 136 - 145 mmol/L Final     Potassium   Date Value Ref Range Status   12/16/2016 3.5 3.5 - 5.1 mmol/L Final     Chloride   Date Value Ref Range Status   12/16/2016 105 95 - 110 mmol/L Final     AST   Date Value Ref Range Status   12/16/2016 14 10 - 40 U/L Final     ALT   Date Value Ref Range Status   12/16/2016 9 (L) 10 - 44 U/L Final     Alkaline Phosphatase   Date Value Ref Range Status   12/16/2016 96 55 - 135 U/L Final     Total Bilirubin   Date Value Ref Range Status   12/16/2016 0.4 0.1 - 1.0 mg/dL Final     Comment:     For infants and newborns, interpretation of results should be based  on gestational age, weight and in agreement with clinical  observations.  Premature Infant recommended reference ranges:  Up to 24 hours.............<8.0 mg/dL  Up to 48 hours............<12.0 mg/dL  3-5 days..................<15.0 mg/dL  6-29 days.................<15.0 mg/dL       Albumin   Date Value Ref Range Status   12/16/2016 4.1 3.5 - 5.2 g/dL Final     No results found for: INR      Assessment/Plan:     1.  Autoimmune hepatitis    2. Gastroesophageal reflux disease, esophagitis presence not specified    3. MS (multiple sclerosis)    4. Colon cancer screening      Debbie Mackenzie is a 52 y.o. female withHepatitis (Autoimmune Hepatitis) and Gastroesophageal Reflux (EGD/ Colonoscopy consult)    Autoimmune hepatitis-liver tests remain normal and stable with decrease in CellCept dosing  -Continue current CellCept  -Repeat labs every 3 months  -     Comprehensive metabolic panel; Standing  -     CBC auto differential; Standing  -     US Abdomen Limited; Future; Expected date: 09/22/2017    Gastroesophageal reflux disease, esophagitis presence not specified  -Further evaluate with upper endoscopy; patient would like to have endoscopic procedures here as she has a family member who can bring her on that day.  She reports not having any assistance if she were to have the procedures done in [place]  -     Case request GI: ESOPHAGOGASTRODUODENOSCOPY (EGD), COLONOSCOPY    MS (multiple sclerosis)  -Advised she follow with her MS specialist for better control of her multiple sclerosis and cautioned against seeing her primary care doctor with intermittent/random use of steroids  -Will try to get her appointment to be seen our MS clinic sometime in the summer for a second opinion.  Recommended that she get her outside records for that appointment.    Colon cancer screening  -Needs screening  -     sodium,potassium,mag sulfates (SUPREP BOWEL PREP KIT) 17.5-3.13-1.6 gram SolR; Take 1 Units by mouth once.  Dispense: 1 Bottle; Refill: 0  -     Case request GI: ESOPHAGOGASTRODUODENOSCOPY (EGD), COLONOSCOPY    RTC in 3 months with preclinic labs    Liv Dempsey MD      Non-compliant or not receiving treatment

## 2024-06-02 DIAGNOSIS — K21.9 GASTROESOPHAGEAL REFLUX DISEASE: ICD-10-CM

## 2024-06-03 RX ORDER — PANTOPRAZOLE SODIUM 40 MG/1
40 TABLET, DELAYED RELEASE ORAL DAILY
Qty: 90 TABLET | Refills: 1 | Status: SHIPPED | OUTPATIENT
Start: 2024-06-03

## 2024-06-13 ENCOUNTER — TELEPHONE (OUTPATIENT)
Dept: NEUROLOGY | Facility: CLINIC | Age: 59
End: 2024-06-13
Payer: COMMERCIAL

## 2024-06-13 NOTE — TELEPHONE ENCOUNTER
Spoke with pt in regards to rescheduling follow up appointment. Pt is rescheduled to Dr. Guerin's next available on 8/28.

## 2024-06-21 DIAGNOSIS — N32.81 OAB (OVERACTIVE BLADDER): ICD-10-CM

## 2024-06-21 RX ORDER — OXYBUTYNIN CHLORIDE 10 MG/1
10 TABLET, EXTENDED RELEASE ORAL NIGHTLY
Qty: 90 TABLET | Refills: 1 | Status: SHIPPED | OUTPATIENT
Start: 2024-06-21

## 2024-07-01 DIAGNOSIS — G35 MS (MULTIPLE SCLEROSIS): ICD-10-CM

## 2024-07-01 DIAGNOSIS — R53.82 CHRONIC FATIGUE: ICD-10-CM

## 2024-07-02 RX ORDER — MODAFINIL 200 MG/1
200 TABLET ORAL DAILY
Qty: 90 TABLET | Refills: 1 | Status: SHIPPED | OUTPATIENT
Start: 2024-07-02

## 2024-07-03 ENCOUNTER — PATIENT MESSAGE (OUTPATIENT)
Dept: HEPATOLOGY | Facility: CLINIC | Age: 59
End: 2024-07-03
Payer: COMMERCIAL

## 2024-07-24 ENCOUNTER — PATIENT MESSAGE (OUTPATIENT)
Dept: HEPATOLOGY | Facility: CLINIC | Age: 59
End: 2024-07-24
Payer: COMMERCIAL

## 2024-07-25 ENCOUNTER — PATIENT MESSAGE (OUTPATIENT)
Dept: PSYCHIATRY | Facility: CLINIC | Age: 59
End: 2024-07-25
Payer: COMMERCIAL

## 2024-07-31 ENCOUNTER — OFFICE VISIT (OUTPATIENT)
Dept: HEPATOLOGY | Facility: CLINIC | Age: 59
End: 2024-07-31
Payer: COMMERCIAL

## 2024-07-31 VITALS — HEIGHT: 65 IN | BODY MASS INDEX: 26.49 KG/M2 | WEIGHT: 159 LBS

## 2024-07-31 DIAGNOSIS — K75.4 AUTOIMMUNE HEPATITIS: Primary | ICD-10-CM

## 2024-07-31 PROCEDURE — 1159F MED LIST DOCD IN RCRD: CPT | Mod: CPTII,95,, | Performed by: INTERNAL MEDICINE

## 2024-07-31 PROCEDURE — 99213 OFFICE O/P EST LOW 20 MIN: CPT | Mod: 95,,, | Performed by: INTERNAL MEDICINE

## 2024-07-31 PROCEDURE — 3008F BODY MASS INDEX DOCD: CPT | Mod: CPTII,95,, | Performed by: INTERNAL MEDICINE

## 2024-07-31 PROCEDURE — 1160F RVW MEDS BY RX/DR IN RCRD: CPT | Mod: CPTII,95,, | Performed by: INTERNAL MEDICINE

## 2024-07-31 RX ORDER — CHLORPHENIRAMINE MALEATE 4 MG
TABLET ORAL
COMMUNITY
Start: 2024-07-12

## 2024-07-31 NOTE — PROGRESS NOTES
Subjective:     Debbie Mackenzie is here for follow up of Autoimmune hepatitis      HPI  Since Debbie Mackenzie's last visit she's had sinus surgery again and recovering well. No liver issues. She reports recent liver tests were normal. She has been taking her meds.     Review of Systems    Objective:     Physical Exam    Computed MELD 3.0 unavailable. One or more values for this score either were not found within the given timeframe or did not fit some other criterion.  Computed MELD-Na unavailable. One or more values for this score either were not found within the given timeframe or did not fit some other criterion.      WBC   Date Value Ref Range Status   12/16/2016 4.68 3.90 - 12.70 K/uL Final     Hemoglobin   Date Value Ref Range Status   12/16/2016 11.2 (L) 12.0 - 16.0 g/dL Final     Hematocrit   Date Value Ref Range Status   12/16/2016 36.8 (L) 37.0 - 48.5 % Final     Platelets   Date Value Ref Range Status   12/16/2016 269 150 - 350 K/uL Final     BUN   Date Value Ref Range Status   12/16/2016 16 6 - 20 mg/dL Final     Creatinine   Date Value Ref Range Status   12/16/2016 1.0 0.5 - 1.4 mg/dL Final     Glucose   Date Value Ref Range Status   12/16/2016 84 70 - 110 mg/dL Final     Calcium   Date Value Ref Range Status   12/16/2016 9.5 8.7 - 10.5 mg/dL Final     Sodium   Date Value Ref Range Status   12/16/2016 141 136 - 145 mmol/L Final     Potassium   Date Value Ref Range Status   12/16/2016 3.5 3.5 - 5.1 mmol/L Final     Chloride   Date Value Ref Range Status   12/16/2016 105 95 - 110 mmol/L Final     AST   Date Value Ref Range Status   12/16/2016 14 10 - 40 U/L Final     ALT   Date Value Ref Range Status   12/16/2016 9 (L) 10 - 44 U/L Final     Alkaline Phosphatase   Date Value Ref Range Status   12/16/2016 96 55 - 135 U/L Final     Total Bilirubin   Date Value Ref Range Status   12/16/2016 0.4 0.1 - 1.0 mg/dL Final     Comment:     For infants and newborns, interpretation of results should be based  on  "gestational age, weight and in agreement with clinical  observations.  Premature Infant recommended reference ranges:  Up to 24 hours.............<8.0 mg/dL  Up to 48 hours............<12.0 mg/dL  3-5 days..................<15.0 mg/dL  6-29 days.................<15.0 mg/dL       Albumin   Date Value Ref Range Status   12/16/2016 4.1 3.5 - 5.2 g/dL Final     No results found for: "INR"      Assessment/Plan:     1. Autoimmune hepatitis      Debbie Mackenzie is a 59 y.o. female withAutoimmune hepatitis    Autoimmune hepatitis-controlled disease  -Continue on cellcept  -Will get outside labs sen to use  -Repeat labs in 6 months  -     Comprehensive Metabolic Panel; Future; Expected date: 07/31/2024  -     CBC Auto Differential; Future; Expected date: 07/31/2024    RTC in 6 months with preclinic labs, since she does virtual visit she can see a [facility] hep provider    The patient location is: [place]  The chief complaint leading to consultation is: See above in note    Visit type: audiovisual    Face to Face time with patient: 10 minutes  20 minutes of total time spent on the encounter, which includes face to face time and non-face to face time preparing to see the patient (eg, review of tests), Obtaining and/or reviewing separately obtained history, Documenting clinical information in the electronic or other health record, Independently interpreting results (not separately reported) and communicating results to the patient/family/caregiver, or Care coordination (not separately reported).         Each patient to whom he or she provides medical services by telemedicine is:  (1) informed of the relationship between the physician and patient and the respective role of any other health care provider with respect to management of the patient; and (2) notified that he or she may decline to receive medical services by telemedicine and may withdraw from such care at any time.        Liv Moore MD     Outside records reviewed "     Labs from July 23, 2024 show a sodium of 140, potassium 4.0, creatinine 0.9, albumin 4.6, alkaline phosphatase 64, ALT 14, AST 12, total bilirubin 0.4, white blood cell count 4.7, hemoglobin 12.0, platelet count 268

## 2024-07-31 NOTE — Clinical Note
Please get out labs from [facility], repeat labs and visit in 6 months. Patient wanted a call back about how to pay for the visit as the portal was not working.

## 2024-08-05 ENCOUNTER — PATIENT MESSAGE (OUTPATIENT)
Dept: PSYCHIATRY | Facility: CLINIC | Age: 59
End: 2024-08-05
Payer: COMMERCIAL

## 2024-08-28 ENCOUNTER — OFFICE VISIT (OUTPATIENT)
Dept: NEUROLOGY | Facility: CLINIC | Age: 59
End: 2024-08-28
Payer: COMMERCIAL

## 2024-08-28 VITALS
SYSTOLIC BLOOD PRESSURE: 150 MMHG | HEIGHT: 65 IN | BODY MASS INDEX: 28.11 KG/M2 | DIASTOLIC BLOOD PRESSURE: 89 MMHG | HEART RATE: 75 BPM | WEIGHT: 168.75 LBS

## 2024-08-28 DIAGNOSIS — G35 MS (MULTIPLE SCLEROSIS): Primary | ICD-10-CM

## 2024-08-28 PROCEDURE — 99999 PR PBB SHADOW E&M-EST. PATIENT-LVL V: CPT | Mod: PBBFAC,,,

## 2024-08-28 PROCEDURE — 3079F DIAST BP 80-89 MM HG: CPT | Mod: CPTII,S$GLB,,

## 2024-08-28 PROCEDURE — 3077F SYST BP >= 140 MM HG: CPT | Mod: CPTII,S$GLB,,

## 2024-08-28 PROCEDURE — 1159F MED LIST DOCD IN RCRD: CPT | Mod: CPTII,S$GLB,,

## 2024-08-28 PROCEDURE — 3008F BODY MASS INDEX DOCD: CPT | Mod: CPTII,S$GLB,,

## 2024-08-28 PROCEDURE — 1160F RVW MEDS BY RX/DR IN RCRD: CPT | Mod: CPTII,S$GLB,,

## 2024-08-28 PROCEDURE — 99215 OFFICE O/P EST HI 40 MIN: CPT | Mod: S$GLB,,,

## 2024-08-28 PROCEDURE — G2211 COMPLEX E/M VISIT ADD ON: HCPCS | Mod: S$GLB,,,

## 2024-08-28 RX ORDER — DIAZEPAM 5 MG/1
5 TABLET ORAL
Qty: 2 TABLET | Refills: 0 | Status: SHIPPED | OUTPATIENT
Start: 2024-08-28 | End: 2024-09-27

## 2024-08-28 NOTE — PATIENT INSTRUCTIONS
For sleep:   -get white noise sound machine  -turn off TV  -restart oxybutynin to help with nighttime urination     For anxiety:   -[facility] - [phone] - call to schedule with first available. (Psychology for talk therapy, psychiatry for medication management)  -look into [email or web address]     
no

## 2024-08-28 NOTE — PROGRESS NOTES
Patient ID: Debbie Mackenzie is a 59 y.o. female who presents today for a routine clinic visit for MS.  She was last seen on 12/28/2023.  The history was provided by the patient.     Principle neurological diagnosis: MS  Date of symptom onset: 1984  Date of diagnosis: 1984  Disease type at diagnosis: RR  Disease type currently: RR  Previous therapy: Intermittent steroids, Betaseron (flu-like reaction)  Current therapy: Cellcept 2014 - present   Vitamin D Dose: 15015CI weekly   Last MRI Brain: 10/9/2023 - stable   Last MRI C-spine: 10/9/2023 - stable   Last MRI T-spine: 4/4/23 - no lesions   CSF: 1984 (diagnostic clinic at [place], reportedly consistent with MS)  JCV status: NA  Other relevant labs and tests: Vitamin D 48 2016    Subjective:     She states that she is still having fatigue issues, but she was not taking modafinil everyday since she was not working everyday.  She is about to start working part time and is planning on restarting it everyday.  She states what when she does take it, it does help.      She also states that she does not sleep well.  She does take Xanax at night, but states that it does not really help.  She is not able to fall asleep due to anxiety and when she does fall asleep she wakes up multiple times to urinate or if she hears something in the house or on TV.  She does sleep with the TV on as it helps drown out the sounds of the house that wake her up. She is also not taking the oxybutynin nightly.     She says that she has had anxiety since she was younger and has been on Lexapro for a long time.       ROS:      8/26/2024    12:46 PM   REVIEW OF SYMPTOMS   Do you feel abnormally tired on most days? Yes   Do you feel you generally sleep well? No   Do you have difficulty controlling your bladder?  Yes   Do you have difficulty controlling your bowels?  No   Do you have frequent muscle cramps, tightness or spasms in your limbs?  No   Do you have new visual symptoms?  No   Do you have  worsening difficulty with your memory or thinking? No   Do you have worsening symptoms of anxiety or depression?  No   For patients who walk, Do you have more difficulty walking?  No   Have you fallen since your last visit?  No   For patients who use wheelchairs: Do you have any skin wounds or breakdown? Not Applicable   Do you have difficulty using your hands?  No   Do you have shooting or burning pain? No   Do you have difficulty with sexual function?  No   If you are sexually active, are you using birth control? Y/N  N/A Not Applicable   Do you often choke when swallowing liquids or solid food?  No   Do you experience worsening symptoms when overheated? No   Do you need any new equipment such as a wheelchair, walker or shower chair? No   Do you receive co-pay financial assistance for your principal MS medicine? Not Applicable   Would you be interested in participating in an MS research trial in the future? No   For patients on Gilenya, Tecfidera, Aubagio, Rituxan, Ocrevus, Tysabri, Lemtrada or Methotrexate, are you aware that you should NOT receive live virus vaccines?  Not Applicable   Do you feel you have adequate family/friend support?  Yes   Do you have health insurance?   Yes   Are you currently employed? No   Do you receive SSDI/SSI?  Not Applicable   Do you use marijuana or cannabis products? No   Have you been diagnosed with a urinary tract infection since your last visit here? No   Have you been diagnosed with a respiratory tract infection since your last visit here? No   Have you been to the emergency room since your last visit here? No   Have you been hospitalized since your last visit here?  No          SOCIAL HISTORY  Living arrangements - the patient lives alone.  Social History     Socioeconomic History    Marital status: Single   Tobacco Use    Smoking status: Never    Smokeless tobacco: Never   Substance and Sexual Activity    Alcohol use: No    Drug use: No    Sexual activity: Never        Current Outpatient Medications on File Prior to Visit   Medication Sig Dispense Refill    ALBUTEROL INHL Inhale into the lungs.      alprazolam (XANAX) 0.5 MG tablet Take 0.5 mg by mouth once daily. Once daily.      azelastine (ASTELIN) 137 mcg (0.1 %) nasal spray by Nasal route.      BYSTOLIC 5 mg Tab Take 5 mg by mouth once daily.      calcium carbonate (OS-REGINA) 600 mg calcium (1,500 mg) Tab Take 600 mg by mouth.      chlorpheniramine (CHLOR-TRIMETON) 4 mg tablet TAKE ONE TABLET BY MOUTH TWICE DAILY AS NEEDED      ergocalciferol (ERGOCALCIFEROL) 50,000 unit Cap Take 50,000 Units by mouth once daily. Once daily.      escitalopram oxalate (LEXAPRO) 10 MG tablet Take 30 mg by mouth once daily.       estradiol (ESTRACE) 0.5 MG tablet Take 0.5 mg by mouth.      Lactobacillus rhamnosus GG (CULTURELLE) 10 billion cell capsule Take 1 capsule by mouth once daily.      loratadine (CLARITIN) 10 mg tablet Take 10 mg by mouth.      medroxyPROGESTERone (PROVERA) 2.5 MG tablet Take 2.5 mg by mouth once daily.  12    metoprolol succinate (TOPROL-XL) 25 MG 24 hr tablet Take 25 mg by mouth once daily.      modafiniL (PROVIGIL) 200 MG Tab Take 1 tablet (200 mg total) by mouth once daily. 90 tablet 1    montelukast (SINGULAIR) 10 mg tablet Take 10 mg by mouth once daily.      mycophenolate (CELLCEPT) 500 mg Tab Take 1 tablet (500 mg total) by mouth 2 (two) times daily. 180 tablet 3    mycophenolate (CELLCEPT) 500 mg Tab 500 mg 2 (two) times a day.      oxybutynin (DITROPAN-XL) 10 MG 24 hr tablet TAKE 1 TABLET BY MOUTH ONCE DAILY IN THE EVENING 90 tablet 1    pantoprazole (PROTONIX) 40 MG tablet Take 1 tablet (40 mg total) by mouth once daily. 90 tablet 1    tramadol (ULTRAM) 50 mg tablet Take 50 mg by mouth every 6 (six) hours as needed for Pain.       No current facility-administered medications on file prior to visit.       Objective:     1. 25 foot timed walk:      12/28/2023    12:01 AM 8/28/2024    12:02 AM   Timed 25 Foot  "Walk:   Did patient wear an AFO? No No   Was assistive device used? No No   Time for 25 Foot Walk (seconds) 4.85 4.05   Time for 25 Foot Walk (seconds) 4.5 4.12           NEURO EXAM    In general, the patient is well nourished and appears to be in no acute distress.    MENTAL STATUS: language is fluent, normal verbal comprehension, short-term and remote memory is intact, attention is normal, patient is alert and oriented x 3, fund of knowlege is appropriate by vocabulary.     GAIT: Narrow based and stable.     Full neuro exam deferred at this time due to time limitations     Imaging:     Personally reviewed.     MRI Brain W/WO Contrast 10/9/2023:      Impression:     Periventricular and some subcortical white matter changes seen with a pattern and distribution consistent with patient's history of demyelinating process/multiple sclerosis.  No evidence of enhancing plaque is seen to suggest active demyelination.     Findings are unchanged since the prior examination        Electronically signed by: Teofilo Bradford  Date:                                            10/09/2023  Time:                                           16:18          MRI Cervical Spine W/WO Contrast 10/9/2023:       Impression:     No evidence of active or inactive demyelination seen     Left paracentral disc bulge at T1-T2 as outlined above overall unchanged since prior examination        Electronically signed by: Teofilo Bradford  Date:                                            10/09/2023  Time:                                           16:05          Labs:     Lab Results   Component Value Date    [id number] 48 12/16/2016     No results found for: "JCVINDEX", "JCVANTIBODY"  No results found for: "PH5GOLPA", "ABSOLUTECD3", "YE3CHHFZ", "ABSOLUTECD8", "YJ4EDVVH", "ABSOLUTECD4", "LABCD48"  Lab Results   Component Value Date    WBC 4.68 12/16/2016    RBC 4.30 12/16/2016    HGB 11.2 (L) 12/16/2016    HCT 36.8 (L) 12/16/2016    MCV 86 " 12/16/2016    MCH 26.0 (L) 12/16/2016    MCHC 30.4 (L) 12/16/2016    RDW 13.9 12/16/2016     12/16/2016    MPV 11.8 12/16/2016    GRAN 2.0 12/16/2016    GRAN 43.4 12/16/2016    LYMPH 2.0 12/16/2016    LYMPH 42.9 12/16/2016    MONO 0.6 12/16/2016    MONO 12.4 12/16/2016    EOS 0.1 12/16/2016    BASO 0.01 12/16/2016    EOSINOPHIL 1.1 12/16/2016    BASOPHIL 0.2 12/16/2016     Sodium   Date Value Ref Range Status   12/16/2016 141 136 - 145 mmol/L Final     Potassium   Date Value Ref Range Status   12/16/2016 3.5 3.5 - 5.1 mmol/L Final     Chloride   Date Value Ref Range Status   12/16/2016 105 95 - 110 mmol/L Final     CO2   Date Value Ref Range Status   12/16/2016 27 23 - 29 mmol/L Final     Glucose   Date Value Ref Range Status   12/16/2016 84 70 - 110 mg/dL Final     BUN   Date Value Ref Range Status   12/16/2016 16 6 - 20 mg/dL Final     Creatinine   Date Value Ref Range Status   12/16/2016 1.0 0.5 - 1.4 mg/dL Final     Calcium   Date Value Ref Range Status   12/16/2016 9.5 8.7 - 10.5 mg/dL Final     Total Protein   Date Value Ref Range Status   12/16/2016 8.3 6.0 - 8.4 g/dL Final     Albumin   Date Value Ref Range Status   12/16/2016 4.1 3.5 - 5.2 g/dL Final     Total Bilirubin   Date Value Ref Range Status   12/16/2016 0.4 0.1 - 1.0 mg/dL Final     Comment:     For infants and newborns, interpretation of results should be based  on gestational age, weight and in agreement with clinical  observations.  Premature Infant recommended reference ranges:  Up to 24 hours.............<8.0 mg/dL  Up to 48 hours............<12.0 mg/dL  3-5 days..................<15.0 mg/dL  6-29 days.................<15.0 mg/dL       Alkaline Phosphatase   Date Value Ref Range Status   12/16/2016 96 55 - 135 U/L Final     AST   Date Value Ref Range Status   12/16/2016 14 10 - 40 U/L Final     ALT   Date Value Ref Range Status   12/16/2016 9 (L) 10 - 44 U/L Final     Anion Gap   Date Value Ref Range Status   12/16/2016 9 8 - 16 mmol/L  Final     eGFR if    Date Value Ref Range Status   12/16/2016 >60.0 >60 mL/min/1.73 m^2 Final     eGFR if non    Date Value Ref Range Status   12/16/2016 >60.0 >60 mL/min/1.73 m^2 Final     Comment:     Calculation used to obtain the estimated glomerular filtration  rate (eGFR) is the CKD-EPI equation. Since race is unknown   in our information system, the eGFR values for   -American and Non--American patients are given   for each creatinine result.       Lab Results   Component Value Date    HEPBSAB Negative 12/16/2016           MS Impression and Plan:     NEURO MULTIPLE SCLEROSIS IMPRESSION:   Number of relapses in the past year?:  0  Clinical Progression:  Clinically Stable  MRI Progression:  Stable  MS Classification:  Relapsing-Remitting MS  DMT:  No change in management  DMT comment:  Continue to monitor off DMT.  Continue Cellcept for autoimmune hepatitis per hepatology   Symptom Management:  Implement change in symptom management  Implement Change in Symptom Management:  Sleep, Fatigue, Mood and Bladder     Discussed symptom management.  Anxiety and sleep hygiene are effecting sleep negatively. Will refer to psychology for talk therapy and psychiatry for med management to help with anxiety which will help with sleep. Also recommended Klatcher  Recommended to turn off TV while sleeping as that may be overstimulating at bedtime.  Discussed using a sound machine with white noise instead.  Also, discussed restarting oxybutynin to reduce nocturia. Better sleep will most likely help with daytime fatigue and discussed taking modafinil more consistently.   MRIs soon - ordered Valium, patient knows to not take Xanax with Valium  Follow up in 6 months with Dr. Guerin    Plan discussed and questions were answered to satisfaction.     Problem List Items Addressed This Visit          Neuro    MS (multiple sclerosis) - Primary    Relevant Medications    diazePAM  (VALIUM) 5 MG tablet    Other Relevant Orders    Ambulatory consult to Psychology    Ambulatory referral/consult to Psychiatry    MRI Brain Demyelinating W W/O Contrast    MRI Cervical Spine Demyelinating W W/O Contrast       I spent a total of 55 minutes on the day of the visit.This includes face to face time and non-face to face time preparing to see the patient (eg, review of tests), obtaining and/or reviewing separately obtained history, documenting clinical information in the electronic or other health record, independently interpreting results and communicating results to the patient/family/caregiver, or care coordinator.       Tiffanie Fabian, [id number]

## 2024-09-24 ENCOUNTER — HOSPITAL ENCOUNTER (OUTPATIENT)
Dept: RADIOLOGY | Facility: HOSPITAL | Age: 59
Discharge: HOME | End: 2024-09-24
Payer: COMMERCIAL

## 2024-09-24 DIAGNOSIS — G35 MS (MULTIPLE SCLEROSIS): ICD-10-CM

## 2024-09-24 PROCEDURE — 70553 MRI BRAIN STEM W/O & W/DYE: CPT | Mod: TC

## 2024-09-24 PROCEDURE — 25500020 PHARM REV CODE 255

## 2024-09-24 PROCEDURE — A9577 INJ MULTIHANCE: HCPCS

## 2024-09-24 RX ADMIN — GADOBENATE DIMEGLUMINE 15 ML: 529 INJECTION, SOLUTION INTRAVENOUS at 01:09

## 2024-09-25 ENCOUNTER — PATIENT MESSAGE (OUTPATIENT)
Dept: NEUROLOGY | Facility: CLINIC | Age: 59
End: 2024-09-25
Payer: COMMERCIAL

## 2024-12-04 DIAGNOSIS — K75.4 AUTOIMMUNE HEPATITIS: ICD-10-CM

## 2024-12-04 DIAGNOSIS — K21.9 GASTROESOPHAGEAL REFLUX DISEASE: ICD-10-CM

## 2024-12-04 RX ORDER — PANTOPRAZOLE SODIUM 40 MG/1
40 TABLET, DELAYED RELEASE ORAL DAILY
Qty: 90 TABLET | Refills: 1 | Status: SHIPPED | OUTPATIENT
Start: 2024-12-04

## 2024-12-04 RX ORDER — MYCOPHENOLATE MOFETIL 500 MG/1
500 TABLET ORAL 2 TIMES DAILY
Qty: 180 TABLET | Refills: 3 | Status: SHIPPED | OUTPATIENT
Start: 2024-12-04

## 2024-12-16 ENCOUNTER — PATIENT MESSAGE (OUTPATIENT)
Dept: PSYCHIATRY | Facility: CLINIC | Age: 59
End: 2024-12-16
Payer: COMMERCIAL

## 2025-01-05 DIAGNOSIS — R53.82 CHRONIC FATIGUE: ICD-10-CM

## 2025-01-05 DIAGNOSIS — G35 MS (MULTIPLE SCLEROSIS): ICD-10-CM

## 2025-01-06 RX ORDER — MODAFINIL 200 MG/1
200 TABLET ORAL DAILY
Qty: 90 TABLET | Refills: 1 | Status: SHIPPED | OUTPATIENT
Start: 2025-01-06

## 2025-03-07 ENCOUNTER — PATIENT MESSAGE (OUTPATIENT)
Dept: PSYCHIATRY | Facility: CLINIC | Age: 60
End: 2025-03-07
Payer: COMMERCIAL

## 2025-04-07 ENCOUNTER — OFFICE VISIT (OUTPATIENT)
Dept: NEUROLOGY | Facility: CLINIC | Age: 60
End: 2025-04-07
Payer: COMMERCIAL

## 2025-04-07 VITALS
WEIGHT: 170.75 LBS | DIASTOLIC BLOOD PRESSURE: 84 MMHG | HEART RATE: 86 BPM | HEIGHT: 65 IN | BODY MASS INDEX: 28.45 KG/M2 | SYSTOLIC BLOOD PRESSURE: 146 MMHG

## 2025-04-07 DIAGNOSIS — N39.46 MIXED STRESS AND URGE URINARY INCONTINENCE: ICD-10-CM

## 2025-04-07 DIAGNOSIS — F41.9 ANXIETY: ICD-10-CM

## 2025-04-07 DIAGNOSIS — G35 MS (MULTIPLE SCLEROSIS): Primary | ICD-10-CM

## 2025-04-07 PROCEDURE — 3077F SYST BP >= 140 MM HG: CPT | Mod: CPTII,S$GLB,, | Performed by: STUDENT IN AN ORGANIZED HEALTH CARE EDUCATION/TRAINING PROGRAM

## 2025-04-07 PROCEDURE — 1160F RVW MEDS BY RX/DR IN RCRD: CPT | Mod: CPTII,S$GLB,, | Performed by: STUDENT IN AN ORGANIZED HEALTH CARE EDUCATION/TRAINING PROGRAM

## 2025-04-07 PROCEDURE — 1159F MED LIST DOCD IN RCRD: CPT | Mod: CPTII,S$GLB,, | Performed by: STUDENT IN AN ORGANIZED HEALTH CARE EDUCATION/TRAINING PROGRAM

## 2025-04-07 PROCEDURE — 3008F BODY MASS INDEX DOCD: CPT | Mod: CPTII,S$GLB,, | Performed by: STUDENT IN AN ORGANIZED HEALTH CARE EDUCATION/TRAINING PROGRAM

## 2025-04-07 PROCEDURE — 99999 PR PBB SHADOW E&M-EST. PATIENT-LVL V: CPT | Mod: PBBFAC,,, | Performed by: STUDENT IN AN ORGANIZED HEALTH CARE EDUCATION/TRAINING PROGRAM

## 2025-04-07 PROCEDURE — 3079F DIAST BP 80-89 MM HG: CPT | Mod: CPTII,S$GLB,, | Performed by: STUDENT IN AN ORGANIZED HEALTH CARE EDUCATION/TRAINING PROGRAM

## 2025-04-07 PROCEDURE — 99215 OFFICE O/P EST HI 40 MIN: CPT | Mod: S$GLB,,, | Performed by: STUDENT IN AN ORGANIZED HEALTH CARE EDUCATION/TRAINING PROGRAM

## 2025-04-07 PROCEDURE — G2211 COMPLEX E/M VISIT ADD ON: HCPCS | Mod: S$GLB,,, | Performed by: STUDENT IN AN ORGANIZED HEALTH CARE EDUCATION/TRAINING PROGRAM

## 2025-04-07 RX ORDER — PHENTERMINE HYDROCHLORIDE 37.5 MG/1
37.5 TABLET ORAL DAILY PRN
COMMUNITY
Start: 2025-03-19

## 2025-04-07 NOTE — PROGRESS NOTES
[facility]  Follow Up Patient Visit      Disease Summary     NEURO MULTIPLE SCLEROISIS SUMMARY:   Principle neurological diagnosis:  MS  Date of Symptom Onset:  1984  Date of Diagnosis:  1984  Disease type at diagnosis:  Relapsing-Remitting MS  Disease type currently:  Relapsing-Remitting MS  Previous Therapy:  First DMT:  Interferon beta-1b SQ - flu-like reactio  Current Therapy:  Other - Cellcept 2014 - present (for autoimmune hepatitis)  Last MRI Brain:  9/24/2024 - stable  Last MRI C-Spine:  10/9/2023 - no lesions  Last MRI T-Spine:  4/4/2023 - no lesions  Labs   No JCV completed  Lab Notes:  CSF: 1984 (diagnostic clinic at [place], reportedly consistent with MS)    Vit D:   Lab Results   Component Value Date    [id number] 48 12/16/2016     Interval history:     Patient presents with her son.   She was having issues finishing her C-spine MRI due to breathing issues when she was having sinus issues.  She retired and returned to work part time  as .    She restarted oxybutynin at night, but she doesn't take it daily.     She fell  2 weeks ago and hurt her L knee, right now it's hurting so much that she's having issues walking or bending her knee.     Her hepatologist is still prescribing her Cellcept which she takes daily without issues.     ROS:     SOCIAL HISTORY  Living arrangements - the patient lives with their family.  Social History     Socioeconomic History    Marital status: Single   Tobacco Use    Smoking status: Never    Smokeless tobacco: Never   Substance and Sexual Activity    Alcohol use: No    Drug use: No    Sexual activity: Never     Social Drivers of Health     Financial Resource Strain: Low Risk  (4/6/2025)    Overall Financial Resource Strain (CARDIA)     Difficulty of Paying Living Expenses: Not hard at all   Food Insecurity: No Food Insecurity (4/6/2025)    Hunger Vital Sign     Worried About Running Out of Food in the Last Year: Never true      Ran Out of Food in the Last Year: Never true   Transportation Needs: No Transportation Needs (4/6/2025)    PRAPARE - Transportation     Lack of Transportation (Medical): No     Lack of Transportation (Non-Medical): No   Physical Activity: Insufficiently Active (4/6/2025)    Exercise Vital Sign     Days of Exercise per Week: 3 days     Minutes of Exercise per Session: 10 min   Stress: No Stress Concern Present (4/6/2025)    [employer] - Occupational Stress Questionnaire     Feeling of Stress : Only a little   Housing Stability: Low Risk  (4/6/2025)    Housing Stability Vital Sign     Unable to Pay for Housing in the Last Year: No     Number of Times Moved in the Last Year: 0     Homeless in the Last Year: No       Patient Employment       Status   Full Time    Employer   [employer] ([employer])    Address   ,                    4/6/2025     2:16 PM   REVIEW OF SYMPTOMS   Do you feel abnormally tired on most days? Yes   Do you feel you generally sleep well? No   Do you have difficulty controlling your bladder?  Yes   Do you have difficulty controlling your bowels?  No   Do you have frequent muscle cramps, tightness or spasms in your limbs?  No   Do you have new visual symptoms?  No   Do you have worsening difficulty with your memory or thinking? No   Do you have worsening symptoms of anxiety or depression?  No   For patients who walk, Do you have more difficulty walking?  No   Have you fallen since your last visit?  Yes   For patients who use wheelchairs: Do you have any skin wounds or breakdown? Not Applicable   Do you have difficulty using your hands?  No   Do you have shooting or burning pain? No   Do you have difficulty with sexual function?  No   If you are sexually active, are you using birth control? Y/N  N/A Not Applicable   Do you often choke when swallowing liquids or solid food?  No   Do you experience worsening symptoms when overheated? Yes   Do you need  any new equipment such as a wheelchair, walker or shower chair? No   Do you receive co-pay financial assistance for your principal MS medicine? Not Applicable   Would you be interested in participating in an MS research trial in the future? No   For patients on Gilenya, Tecfidera, Aubagio, Rituxan, Ocrevus, Tysabri, Lemtrada or Methotrexate, are you aware that you should NOT receive live virus vaccines?  Not Applicable   Do you feel you have adequate family/friend support?  Yes   Do you have health insurance?   Yes   Are you currently employed? Yes   Do you receive SSDI/SSI?  Not Applicable   Do you use marijuana or cannabis products? No   Have you been diagnosed with a urinary tract infection since your last visit here? No   Have you been diagnosed with a respiratory tract infection since your last visit here? Yes   Have you been to the emergency room since your last visit here? No   Have you been hospitalized since your last visit here?  No         4/6/2025     2:23 PM   FSS SCORE & INTERPRETATION   FSS SCORE  40    FSS SCORE INTERPRETATION May be suffering from fatigue        Patient-reported         4/6/2025     2:21 PM 8/26/2024    12:50 PM   MS NASIMA-D SCORE & INTERPRETATION   NASIMA-D SCORE  3  4   NASIMA-D INTERPRETATION  No indication of Depression  No indication of Depression       Patient-reported         4/6/2025     2:19 PM 8/26/2024    12:48 PM   MS MARIAELENA-7 SCORE & INTERPRETATION   MARIAELENA-7 SCORE  2  0   MARIAELENA-7 SCORE INTERPRETATION Normal  Normal       Patient-reported               4/6/2025     2:25 PM   MS BLADDER CONTROL SCORE & INTERPRETATION   BLCS SCORE 4    BLCS SCORE INTERPRETATION  Scores can range from 0-22, with higher scores indicating greater bladder control problems.        Patient-reported         4/6/2025     2:30 PM   MS BOWEL CONTROL SCORE & INTERPRETATION   BWCS SCORE 1    BWCS SCORE INTERPRETATION Scores can range from 0-26, with higher scores indicating greater bowel control problems.         "Patient-reported         4/6/2025     2:26 PM   PEQ MS IMPACT OF VISUAL IMPAIRMENT SCORE & INTERPRETATION   LYNDSEY SCALE SCORE  0    LYNDSEY SCORE INTERPRETATION Scores can range from 0-15, with higher scores indicating greater impact of visual problems on daily activites.        Patient-reported         4/6/2025     2:29 PM   MS PDQ SCORE & INTERPRETATION   PDQ RETROSPECTIVE MEMORY SUBSCALE 1    PDQ ATTENTION/CONCENTRATION SUBSCALE 1    PDQ PROSPECTIVE MEMORY SUBSCALE 2    PDQ PLANNING/ORGANIZATION SUBSCALE 3    PDQ TOTAL SCORE 7    PDQ SCORE INTERPRETATION Scores can range from 0-80, with higher scores indicating greater perceived cognitive impairment.        Patient-reported         4/6/2025     2:32 PM   MSSS SCORE & INTERPRETATION   MSSS TANGIBLE SUPPORT SUBSCALE 93.75    MSSS EMOTIONAL/INFORMATIONAL SUPPORT SUBSCALE 100    MSSS AFFECTIONATE SUPPORT SUBSCALE 100    MSSS POSITIVE SOCIAL INTERACTION SUBSCALE 100    MSSS TOTAL SCORE 98.44    MSSS SCORE INTERPRETATION Scores can range from 0-100, with higher scores indicating greater perceived support.        Patient-reported       Exam:     Vitals:    04/07/25 1332   BP: (!) 146/84   BP Location: Left arm   Patient Position: Sitting   Pulse: 86   Weight: 77.4 kg (170 lb 11.9 oz)   Height: 5' 5" (1.651 m)          In general, the patient is well nourished and appears to be in no acute distress.    MENTAL STATUS: language is fluent, normal verbal comprehension, short-term and remote memory is intact, attention is normal, patient is alert and oriented x 3, fund of knowlege is appropriate by vocabulary. Behavior and judgment appropriate.     CRANIAL NERVE EXAM:  There is no intrernuclear ophthalmoplegia.  Extraocular muscles are intact. No facial asymmetry. Facial sensation is intact bilaterally. There is no dysarthria. Uvula is midline, and palate moves symmetrically. Shoulder shrug intact bilaterlly. Tongue protrusion is midline. Hearing is intact to finger rub " "bilaterally. Neck is supple with full ROM    MOTOR EXAM: Normal bulk and tone throughout UE and LE bilaterally.  Bilateral pronator drift; rapid sequential movements are normal    Strength:  R deltoid 5/5, L deltoid 5/5  R biceps 5/5, L biceps 5/5  R triceps 5/5, L triceps 5/5  R finger flexors 5/5, L finger flexors 5/5  R finger extensors 5/5, L finger extensors 5/5  R finger abductors 5/5, L finger abductors 5/5  R  hip flexors 4+/5 (giveaway weakness) ,  unable to examine LLE due to knee pain  R knee extensors 4+/5 (giveaway weakness), unable to examine LLE due to knee pain  R knee flexors 4+/5 (giveaway weakness), unable to examine LLE due to knee pain  R ankle dorsiflexors 4+/5 (giveaway weakness), unable to examine LLE due to knee pain  R ankle plantarflexors 5/5, unable to examine LLE due to knee pain    SENSORY EXAM: Normal to light touch throughout.    COORDINATION: Normal finger-to-nose exam.        11/21/2022    12:00 PM 12/28/2023    10:30 AM 8/28/2024     2:00 PM   Timed 25 Foot Walk:   Did patient wear an AFO? No No No   Was assistive device used? No No No   Time for 25 Foot Walk (seconds) 4.68 4.85 4.05   Time for 25 Foot Walk (seconds) 4.55 4.5 4.12       Imaging (personally reviewed):       Results for orders placed during the hospital encounter of 09/24/24    MRI Brain Demyelinating W W/O Contrast    Impression  Unchanged mild supratentorial demyelinating plaque burden.  No evidence of active demyelination.      Electronically signed by: Juan R Gross  Date:    09/24/2024  Time:    14:22    No results found for this or any previous visit.    No results found for this or any previous visit.      Labs:     Lab Results   Component Value Date    [id number] 48 12/16/2016     No results found for: "JCVINDEX", "JCVANTIBODY"  No results found for: "YZ2EHOGI", "ABSOLUTECD3", "KL1FQCXA", "ABSOLUTECD8", "JL5DNBNN", "ABSOLUTECD4", "LABCD48"  Lab Results   Component Value Date    WBC 4.68 12/16/2016    RBC " 4.30 12/16/2016    HGB 11.2 (L) 12/16/2016    HCT 36.8 (L) 12/16/2016    MCV 86 12/16/2016    MCH 26.0 (L) 12/16/2016    MCHC 30.4 (L) 12/16/2016    RDW 13.9 12/16/2016     12/16/2016    MPV 11.8 12/16/2016    GRAN 2.0 12/16/2016    GRAN 43.4 12/16/2016    LYMPH 2.0 12/16/2016    LYMPH 42.9 12/16/2016    MONO 0.6 12/16/2016    MONO 12.4 12/16/2016    EOS 0.1 12/16/2016    BASO 0.01 12/16/2016    EOSINOPHIL 1.1 12/16/2016    BASOPHIL 0.2 12/16/2016     Sodium   Date Value Ref Range Status   12/16/2016 141 136 - 145 mmol/L Final     Potassium   Date Value Ref Range Status   12/16/2016 3.5 3.5 - 5.1 mmol/L Final     Chloride   Date Value Ref Range Status   12/16/2016 105 95 - 110 mmol/L Final     CO2   Date Value Ref Range Status   12/16/2016 27 23 - 29 mmol/L Final     Glucose   Date Value Ref Range Status   12/16/2016 84 70 - 110 mg/dL Final     BUN   Date Value Ref Range Status   12/16/2016 16 6 - 20 mg/dL Final     Creatinine   Date Value Ref Range Status   12/16/2016 1.0 0.5 - 1.4 mg/dL Final     Calcium   Date Value Ref Range Status   12/16/2016 9.5 8.7 - 10.5 mg/dL Final     Total Protein   Date Value Ref Range Status   12/16/2016 8.3 6.0 - 8.4 g/dL Final     Albumin   Date Value Ref Range Status   12/16/2016 4.1 3.5 - 5.2 g/dL Final     Total Bilirubin   Date Value Ref Range Status   12/16/2016 0.4 0.1 - 1.0 mg/dL Final     Comment:     For infants and newborns, interpretation of results should be based  on gestational age, weight and in agreement with clinical  observations.  Premature Infant recommended reference ranges:  Up to 24 hours.............<8.0 mg/dL  Up to 48 hours............<12.0 mg/dL  3-5 days..................<15.0 mg/dL  6-29 days.................<15.0 mg/dL       Alkaline Phosphatase   Date Value Ref Range Status   12/16/2016 96 55 - 135 U/L Final     AST   Date Value Ref Range Status   12/16/2016 14 10 - 40 U/L Final     ALT   Date Value Ref Range Status   12/16/2016 9 (L) 10 - 44 U/L  Final     Anion Gap   Date Value Ref Range Status   12/16/2016 9 8 - 16 mmol/L Final     eGFR if    Date Value Ref Range Status   12/16/2016 >60.0 >60 mL/min/1.73 m^2 Final     eGFR if non    Date Value Ref Range Status   12/16/2016 >60.0 >60 mL/min/1.73 m^2 Final     Comment:     Calculation used to obtain the estimated glomerular filtration  rate (eGFR) is the CKD-EPI equation. Since race is unknown   in our information system, the eGFR values for   -American and Non--American patients are given   for each creatinine result.         Diagnosis/Assessment/Plan:       NEURO MULTIPLE SCLEROSIS IMPRESSION:   Number of relapses in the past year?:  0  Clinical Progression:  Clinically Stable  MRI Progression:  Stable  MS Classification:  Relapsing-Remitting MS  Current DMT: other  DMT:  No change in management  Symptom Management:  No change in symptom management  Additional Impressions:   Assessment: RRMS stable off MS DMT  MS ROS reviewed.   Imaging:Check serum sNfL, if low can skip this year's MRI brain  DMT: No additional DMT, she is on Cellcept for autoimmuen hepatitis    Symptoms:  -Psychiatry and psychology referral for anxiety management.   -Pelvic floor PT for urinary stress incontinence  -Check vit D  -Advised patient to see her PCP for knee pain workup.     Discussed brain health measures  Plan discussed and questions were answered to satisfaction.  Return to clinic in 6 months                Total time spent with the patient: 50 minutes, including face to face consultation, chart review and coordination of care, on the day of the visit. This includes face to face time and non-face to face time preparing to see the patient (eg, review of tests), obtaining and/or reviewing separately obtained history, documenting clinical information in the electronic or other health record, independently interpreting results and communicating results to the  patient/family/caregiver, or care coordination.     Carolina Guerin MD, MSc  Attending neurologist

## 2025-05-13 ENCOUNTER — PATIENT MESSAGE (OUTPATIENT)
Dept: PSYCHIATRY | Facility: CLINIC | Age: 60
End: 2025-05-13
Payer: COMMERCIAL

## 2025-06-01 DIAGNOSIS — K21.9 GASTROESOPHAGEAL REFLUX DISEASE: ICD-10-CM

## 2025-06-02 RX ORDER — PANTOPRAZOLE SODIUM 40 MG/1
40 TABLET, DELAYED RELEASE ORAL
Qty: 90 TABLET | Refills: 0 | Status: SHIPPED | OUTPATIENT
Start: 2025-06-02

## 2025-06-19 ENCOUNTER — PATIENT MESSAGE (OUTPATIENT)
Dept: PSYCHIATRY | Facility: CLINIC | Age: 60
End: 2025-06-19
Payer: COMMERCIAL

## 2025-07-30 ENCOUNTER — PATIENT MESSAGE (OUTPATIENT)
Dept: PSYCHIATRY | Facility: CLINIC | Age: 60
End: 2025-07-30
Payer: COMMERCIAL

## 2025-08-01 ENCOUNTER — PATIENT MESSAGE (OUTPATIENT)
Dept: PSYCHIATRY | Facility: CLINIC | Age: 60
End: 2025-08-01
Payer: COMMERCIAL

## 2025-08-18 ENCOUNTER — PATIENT MESSAGE (OUTPATIENT)
Dept: PSYCHIATRY | Facility: CLINIC | Age: 60
End: 2025-08-18
Payer: COMMERCIAL